# Patient Record
Sex: MALE | Race: WHITE
[De-identification: names, ages, dates, MRNs, and addresses within clinical notes are randomized per-mention and may not be internally consistent; named-entity substitution may affect disease eponyms.]

---

## 2021-02-11 ENCOUNTER — NURSE TRIAGE (OUTPATIENT)
Dept: OTHER | Facility: CLINIC | Age: 26
End: 2021-02-11

## 2021-02-11 ENCOUNTER — VIRTUAL VISIT (OUTPATIENT)
Dept: FAMILY MEDICINE CLINIC | Age: 26
End: 2021-02-11
Payer: MEDICAID

## 2021-02-11 DIAGNOSIS — R10.32 LLQ ABDOMINAL PAIN: Primary | ICD-10-CM

## 2021-02-11 PROCEDURE — 99202 OFFICE O/P NEW SF 15 MIN: CPT | Performed by: FAMILY MEDICINE

## 2021-02-11 RX ORDER — CIPROFLOXACIN 500 MG/1
500 TABLET, FILM COATED ORAL 2 TIMES DAILY
Qty: 10 TABLET | Refills: 0 | Status: SHIPPED | OUTPATIENT
Start: 2021-02-11 | End: 2021-02-16

## 2021-02-11 RX ORDER — METRONIDAZOLE 500 MG/1
500 TABLET ORAL 3 TIMES DAILY
Qty: 15 TABLET | Refills: 0 | Status: SHIPPED | OUTPATIENT
Start: 2021-02-11 | End: 2021-02-16

## 2021-02-11 RX ORDER — POLYETHYLENE GLYCOL 3350 17 G/17G
17 POWDER, FOR SOLUTION ORAL 2 TIMES DAILY
Qty: 1020 G | Refills: 0 | Status: SHIPPED | OUTPATIENT
Start: 2021-02-11 | End: 2021-03-13

## 2021-02-11 SDOH — ECONOMIC STABILITY: FOOD INSECURITY: WITHIN THE PAST 12 MONTHS, THE FOOD YOU BOUGHT JUST DIDN'T LAST AND YOU DIDN'T HAVE MONEY TO GET MORE.: NOT ASKED

## 2021-02-11 SDOH — ECONOMIC STABILITY: TRANSPORTATION INSECURITY
IN THE PAST 12 MONTHS, HAS THE LACK OF TRANSPORTATION KEPT YOU FROM MEDICAL APPOINTMENTS OR FROM GETTING MEDICATIONS?: NO

## 2021-02-11 ASSESSMENT — ENCOUNTER SYMPTOMS
RHINORRHEA: 0
CONSTIPATION: 0
SORE THROAT: 0
SHORTNESS OF BREATH: 0
NAUSEA: 1
ABDOMINAL PAIN: 1
DIARRHEA: 1
CHEST TIGHTNESS: 0
BLOOD IN STOOL: 0
VOMITING: 1

## 2021-02-11 ASSESSMENT — PATIENT HEALTH QUESTIONNAIRE - PHQ9
SUM OF ALL RESPONSES TO PHQ QUESTIONS 1-9: 1
SUM OF ALL RESPONSES TO PHQ9 QUESTIONS 1 & 2: 0
6. FEELING BAD ABOUT YOURSELF - OR THAT YOU ARE A FAILURE OR HAVE LET YOURSELF OR YOUR FAMILY DOWN: 0
7. TROUBLE CONCENTRATING ON THINGS, SUCH AS READING THE NEWSPAPER OR WATCHING TELEVISION: 0
SUM OF ALL RESPONSES TO PHQ QUESTIONS 1-9: 1
2. FEELING DOWN, DEPRESSED OR HOPELESS: 0

## 2021-02-11 NOTE — PROGRESS NOTES
Psychiatric/Behavioral: Negative for hallucinations. All other systems reviewed and are negative. ROS above this line reviewed by Provider. Objective: There were no vitals taken for this visit. Physical Exam  Nursing note reviewed. Constitutional:       General: He is not in acute distress. Appearance: Normal appearance. He is not ill-appearing, toxic-appearing or diaphoretic. HENT:      Head: Normocephalic and atraumatic. Pulmonary:      Effort: Pulmonary effort is normal.   Neurological:      Mental Status: He is alert. Psychiatric:         Mood and Affect: Mood normal.         Behavior: Behavior normal.       Assessment and Plan:   1. LLQ abdominal pain  Constipation vs Diverticulitis vs IBS. Patient reports pain was intense and doesn't think constipated. Outpatient therapy for diverticulitis although lower risk. Plan to treat for IBS if persists. Patient will send my chart message next week on progress. Advised precautions if symptoms do not improve or worsen- ED.  - polyethylene glycol (GLYCOLAX) 17 GM/SCOOP powder; Take 17 g by mouth 2 times daily  Dispense: 1020 g; Refill: 0  - ciprofloxacin (CIPRO) 500 MG tablet; Take 1 tablet by mouth 2 times daily for 5 days  Dispense: 10 tablet; Refill: 0  - metroNIDAZOLE (FLAGYL) 500 MG tablet; Take 1 tablet by mouth 3 times daily for 5 days  Dispense: 15 tablet;  Refill: 0 Joellen Webster  is a 22 y.o. male being evaluated by a Virtual Visit (video visit) encounter to address concerns as mentioned above. A caregiver was present when appropriate. Due to this being a TeleHealth encounter (During PZWQJ-92 public health emergency), evaluation of the following organ systems was limited: Vitals/Constitutional/EENT/Resp/CV/GI//MS/Neuro/Skin/Heme-Lymph-Imm. Pursuant to the emergency declaration under the [address] [place] [address] and the [employer] Act, this Virtual Visit was conducted with patient's (and/or legal guardian's) consent, to reduce the patient's risk of exposure to COVID-19 and provide necessary medical care. The patient (and/or legal guardian) has also been advised to contact this office for worsening conditions or problems, and seek emergency medical treatment and/or call 911 if deemed necessary. Patient identification was verified at the start of the visit: Yes    Total time spent for this encounter: Not billed by time    Services were provided through a video synchronous discussion virtually to substitute for in-person clinic visit. Patient  located at their individual homes. This chart note was prepared using a voice recognition dictation program. This note was reviewed for accuracy; however, addition, deletion and sound-alike word errors may occur. If there are any questions regarding this chart note, please contact the originating provider. Electronically signed by   Sammy Vidal MD  2/11/2021   1:16 PM    No follow-ups on file.

## 2021-02-11 NOTE — TELEPHONE ENCOUNTER
Reason for Disposition   Patient wants to be seen    Answer Assessment - Initial Assessment Questions  1. LOCATION: \"Where does it hurt? \"       States all below the belly button mostly on the left side    2. RADIATION: \"Does the pain shoot anywhere else? \" (e.g., chest, back)      States back pain but thinks it is unrelated    3. ONSET: \"When did the pain begin? \" (Minutes, hours or days ago)       States pain started a few weeks ago but has gotten worse the last few days    4. SUDDEN: \"Gradual or sudden onset? \"      Gradual    5. PATTERN \"Does the pain come and go, or is it constant? \"     - If constant: \"Is it getting better, staying the same, or worsening? \"       (Note: Constant means the pain never goes away completely; most serious pain is constant and it progresses)      - If intermittent: \"How long does it last?\" \"Do you have pain now? \"      (Note: Intermittent means the pain goes away completely between bouts)      Constant    6. SEVERITY: \"How bad is the pain? \"  (e.g., Scale 1-10; mild, moderate, or severe)     - MILD (1-3): doesn't interfere with normal activities, abdomen soft and not tender to touch      - MODERATE (4-7): interferes with normal activities or awakens from sleep, tender to touch      - SEVERE (8-10): excruciating pain, doubled over, unable to do any normal activities        5/10 states pain changes and is relieved with a bm or gas then starts again    7. RECURRENT SYMPTOM: \"Have you ever had this type of abdominal pain before? \" If so, ask: \"When was the last time? \" and \"What happened that time? \"       Denies    8. CAUSE: \"What do you think is causing the abdominal pain? \"      States it feels like a lot of gassy pressure    9. RELIEVING/AGGRAVATING FACTORS: \"What makes it better or worse? \" (e.g., movement, antacids, bowel movement)      States bm/passing gas makes better    10. OTHER SYMPTOMS: \"Has there been any vomiting, diarrhea, constipation, or urine problems? \"        Vomited x2 on Tuesday. Protocols used: ABDOMINAL PAIN - MALE-ADULT-OH    Patient called eliane at [employer] [facility] pre-service center [facility])  with red flag complaint. Brief description of triage: see above    Triage indicates for patient to be seen in office today. Care advice provided, patient verbalizes understanding; denies any other questions or concerns; instructed to call back for any new or worsening symptoms. Writer provided warm transfer to uzma lee at [facility] for appointment scheduling. Attention Provider: Thank you for allowing me to participate in the care of your patient. The patient was connected to triage in response to information provided to the E[facility]. Please do not respond through this encounter as the response is not directed to a shared pool.

## 2021-04-12 ENCOUNTER — OFFICE VISIT (OUTPATIENT)
Dept: FAMILY MEDICINE CLINIC | Age: 26
End: 2021-04-12
Payer: MEDICAID

## 2021-04-12 VITALS
SYSTOLIC BLOOD PRESSURE: 124 MMHG | OXYGEN SATURATION: 97 % | HEART RATE: 112 BPM | BODY MASS INDEX: 21.54 KG/M2 | DIASTOLIC BLOOD PRESSURE: 72 MMHG | HEIGHT: 72 IN | WEIGHT: 159 LBS

## 2021-04-12 DIAGNOSIS — Z00.00 PREVENTATIVE HEALTH CARE: Primary | ICD-10-CM

## 2021-04-12 DIAGNOSIS — H69.81 DYSFUNCTION OF RIGHT EUSTACHIAN TUBE: ICD-10-CM

## 2021-04-12 DIAGNOSIS — H81.11 BENIGN PAROXYSMAL POSITIONAL VERTIGO OF RIGHT EAR: ICD-10-CM

## 2021-04-12 PROCEDURE — 99395 PREV VISIT EST AGE 18-39: CPT | Performed by: FAMILY MEDICINE

## 2021-04-12 RX ORDER — FLUTICASONE PROPIONATE 50 MCG
1 SPRAY, SUSPENSION (ML) NASAL DAILY
Qty: 1 BOTTLE | Refills: 0 | Status: SHIPPED | OUTPATIENT
Start: 2021-04-12 | End: 2021-06-21 | Stop reason: ALTCHOICE

## 2021-04-12 ASSESSMENT — ENCOUNTER SYMPTOMS
ABDOMINAL PAIN: 0
SHORTNESS OF BREATH: 0
SORE THROAT: 0
NAUSEA: 1
RHINORRHEA: 0
CHEST TIGHTNESS: 0
CONSTIPATION: 0
DIARRHEA: 0

## 2021-04-12 NOTE — PROGRESS NOTES
Annual Exam  SUBJECTIVE  Ruben Albert is a 32 y.o. male and is here for a comprehensive physical exam- last exam was. The patient is sexually active. Performs self testicular exams no . The patient participates in regular exercise: yes. Has the patient ever been transfused or tattooed?: no. Reports seatbelt use. Last eye exam: Due  Last Dental visit: Due    Additional History, if indicated: Patient reports Nausea and feeling dizzy when it rains (fal and spring),Describes it as a room spinning sensation. Has ear fullness and started back in high school. Denies palpitations or anxiety. Has seasonal allergies. Has occasional ringing in ears, no trauma and no hearing loss. Wife having baby in May. PHQ-2/9 Depression Screening from Assessments   PHQ Scores 2/11/2021   PHQ2 Score 0   PHQ9 Score 1         Health Maintenance Due   Topic Date Due    Varicella vaccine (1 of 2 - 2-dose childhood series) Never done    HPV vaccine (1 - Male 2-dose series) Never done    COVID-19 Vaccine (1) Never done    DTaP/Tdap/Td vaccine (1 - Tdap) Never done     No current outpatient medications on file prior to visit. No current facility-administered medications on file prior to visit. No past medical history on file.   Past Surgical History:   Procedure Laterality Date    DENTAL SURGERY  2011    HERNIA REPAIR Bilateral     as a child     Allergies   Allergen Reactions    Pcn [Penicillins] Other (See Comments)     Older brother had fatal reaction      Social History     Socioeconomic History    Marital status: Unknown     Spouse name: Not on file    Number of children: Not on file    Years of education: Not on file    Highest education level: Not on file   Occupational History    Not on file   Social Needs    Financial resource strain: Somewhat hard    Food insecurity     Worry: Never true     Inability: Not on file    Transportation needs     Medical: No Non-medical: No   Tobacco Use    Smoking status: Never Smoker    Smokeless tobacco: Never Used   Substance and Sexual Activity    Alcohol use: Yes     Frequency: Monthly or less     Drinks per session: 1 or 2     Binge frequency: Never     Comment: less than once a month     Drug use: Never    Sexual activity: Yes     Partners: Female   Lifestyle    Physical activity     Days per week: Not on file     Minutes per session: Not on file    Stress: Not on file   Relationships    Social connections     Talks on phone: Not on file     Gets together: Not on file     Attends Mu-ism service: Not on file     Active member of club or organization: Not on file     Attends meetings of clubs or organizations: Not on file     Relationship status: Not on file    Intimate partner violence     Fear of current or ex partner: Not on file     Emotionally abused: Not on file     Physically abused: Not on file     Forced sexual activity: Not on file   Other Topics Concern    Not on file   Social History Narrative    Not on file     Review of Systems   Constitutional: Negative for activity change, appetite change, fatigue and fever. HENT: Negative for congestion, rhinorrhea and sore throat. Respiratory: Negative for chest tightness and shortness of breath. Cardiovascular: Negative for chest pain, palpitations and leg swelling. Gastrointestinal: Positive for nausea. Negative for abdominal pain, constipation and diarrhea. Genitourinary: Negative for dysuria and frequency. Musculoskeletal: Negative for arthralgias. Neurological: Positive for dizziness and headaches. Negative for weakness. Psychiatric/Behavioral: Negative for hallucinations. All other systems reviewed and are negative. OBJECTIVE  /72   Pulse 112   Ht 6' (1.829 m)   Wt 159 lb (72.1 kg)   SpO2 97%   BMI 21.56 kg/m²   Chaperone not applicable for exam.   Physical Exam  Vitals signs and nursing note reviewed.    Constitutional: General: He is not in acute distress. Appearance: Normal appearance. He is normal weight. He is not ill-appearing, toxic-appearing or diaphoretic. HENT:      Head: Normocephalic and atraumatic. Right Ear: Tympanic membrane, ear canal and external ear normal. There is no impacted cerumen. Left Ear: Tympanic membrane, ear canal and external ear normal. There is no impacted cerumen. Ears:      Comments: Clear effusion behind right tympanic membrane      Nose: Nose normal. No congestion. Mouth/Throat:      Mouth: Mucous membranes are moist.      Pharynx: No oropharyngeal exudate or posterior oropharyngeal erythema. Eyes:      General: No scleral icterus. Conjunctiva/sclera: Conjunctivae normal.   Neck:      Musculoskeletal: Normal range of motion and neck supple. Cardiovascular:      Rate and Rhythm: Normal rate and regular rhythm. Heart sounds: Normal heart sounds. No murmur. No friction rub. No gallop. Pulmonary:      Effort: Pulmonary effort is normal. No respiratory distress. Breath sounds: Normal breath sounds. No stridor. No wheezing, rhonchi or rales. Abdominal:      General: Abdomen is flat. Bowel sounds are normal. There is no distension. Palpations: Abdomen is soft. Tenderness: There is no abdominal tenderness. Skin:     General: Skin is warm and dry. Neurological:      General: No focal deficit present. Mental Status: He is alert. Psychiatric:         Mood and Affect: Mood normal.         Behavior: Behavior normal.        Labs  No results found for: WBC, HGB, HCT, PLT, TRIG, HDL, ALT, AST, NA, K, CREATININE, BUN, TSH, PSA, INR, GLUF, MICROALBUR    ASSESSMENT AND PLAN  1. Preventative Exam   During this preventive care visit, the following were discussed and/or reviewed for appropriateness for this patient:  · Healthy diet and exercise habits.     · Importance of self skin exam and sun protection  · Reviewed need for lipid and glucose screening. · Reviewed immunization history. · Need for HIV or additional STD screening. Low risk   · Need for tobacco/substance abuse cessation. · Appropriate quantity of alcohol intake. Further studies/referrals as noted. Recommend annual visit and as needed. - CBC Auto Differential; Future  - Comprehensive Metabolic Panel; Future  - Lipid Panel; Future  - TSH WITH REFLEX TO FT4; Future  - fluticasone (FLONASE) 50 MCG/ACT nasal spray; 1 spray by Each Nostril route daily  Dispense: 1 Bottle; Refill: 0    2. Benign paroxysmal positional vertigo of right ear  Due to effusion - trail Flonase and antihistamine if not improving will refer for vestibular rehab. - CBC Auto Differential; Future  - Comprehensive Metabolic Panel; Future  - TSH WITH REFLEX TO FT4; Future    3. Dysfunction of right eustachian tube  Due to effusion - trail Flonase and antihistamine. Follow up  If not improved. - fluticasone (FLONASE) 50 MCG/ACT nasal spray; 1 spray by Each Nostril route daily  Dispense: 1 Bottle; Refill: 0         This chart note was prepared using a voice recognition dictation program.This note was reviewed for accuracy; however, addition,deletion and sound-alike word errors may occur. If there is any question regarding this chart note, please contact the originating provider. Electronically signed by  Hank Worrell MD  4/12/2021   1:52 PM    No follow-ups on file.

## 2021-04-12 NOTE — PATIENT INSTRUCTIONS
Patient Education   Eustachian Tube Problems: Care Instructions  Your Care Instructions     The eustachian (say \"you-STAY-shee-un\") tubes run between the inside of the ears and the throat. They keep air pressure stable in the ears. If your eustachian tubes become blocked, the air pressure in your ears changes. The fluids from a cold can clog eustachian tubes, causing pain in the ears. A quick change in air pressure can cause eustachian tubes to close up. This might happen when an airplane changes altitude or when a  goes up or down underwater. Eustachian tube problems often clear up on their own or after antibiotic treatment. If your tubes continue to be blocked, you may need surgery. Follow-up care is a key part of your treatment and safety. Be sure to make and go to all appointments, and call your doctor if you are having problems. It's also a good idea to know your test results and keep a list of the medicines you take. How can you care for yourself at home? · To ease ear pain, apply a warm washcloth or a heating pad set on low. There may be some drainage from the ear when the heat melts earwax. Put a cloth between the heat source and your skin. Do not use a heating pad with children. · If your doctor prescribed antibiotics, take them as directed. Do not stop taking them just because you feel better. You need to take the full course of antibiotics. · Your doctor may recommend over-the-counter medicine. Be safe with medicines. Oral or nasal decongestants may relieve ear pain. Avoid decongestants that are combined with antihistamines, which tend to cause more blockage. But if allergies seem to be the problem, your doctor may recommend a combination. Be careful with cough and cold medicines. Don't give them to children younger than 6, because they don't work for children that age and can even be harmful. For children 6 and older, always follow all the instructions carefully.  Make sure you know how much medicine to give and how long to use it. And use the dosing device if one is included. When should you call for help? Call your doctor now or seek immediate medical care if:    · You develop sudden, complete hearing loss.     · You have severe pain or feel dizzy.     · You have new or increasing pus or blood draining from your ear.     · You have redness, swelling, or pain around or behind the ear. Watch closely for changes in your health, and be sure to contact your doctor if:    · You do not get better after 2 weeks.     · You have any new symptoms, such as itching or a feeling of fullness in the ear. Where can you learn more? Go to https://Outsmart.[employer]. org and sign in to your Education.com account. Enter Y822 in the [employer] box to learn more about \"Eustachian Tube Problems: Care Instructions. \"     If you do not have an account, please click on the \"Sign Up Now\" link. Current as of: December 2, 2020               Content Version: 12.8  © 2006-2021 Healthwise, Incorporated. Care instructions adapted under license by [facility] ([facility]). If you have questions about a medical condition or this instruction, always ask your healthcare professional. [address] any warranty or liability for your use of this information.

## 2021-04-20 ENCOUNTER — PATIENT MESSAGE (OUTPATIENT)
Dept: FAMILY MEDICINE CLINIC | Age: 26
End: 2021-04-20

## 2021-04-20 DIAGNOSIS — H81.11 BENIGN PAROXYSMAL POSITIONAL VERTIGO OF RIGHT EAR: Primary | ICD-10-CM

## 2021-04-20 NOTE — TELEPHONE ENCOUNTER
From: Tk Rodriguez  To: Vita Gonzalez MD  Sent: 4/20/2021 11:57 AM EDT  Subject: Non-Urgent Aniyah Roberts,  Just an update:  Last night the pressure in my stomach came back really bad. I took some of the stool thinner you had given me before and that cleared it up by mid-morning this morning. It seems I will need to continue to use that once every 3-4 weeks to avoid the pressure. As for the nose/ears. .. I have not seen any improvement from the flownase, and my ears still feel stuffy, with occasional ringing.  (Primarily just my right ear.)

## 2021-04-22 ENCOUNTER — OFFICE VISIT (OUTPATIENT)
Dept: ENT CLINIC | Age: 26
End: 2021-04-22
Payer: MEDICAID

## 2021-04-22 VITALS
BODY MASS INDEX: 20.61 KG/M2 | HEART RATE: 62 BPM | WEIGHT: 152 LBS | TEMPERATURE: 97.7 F | OXYGEN SATURATION: 98 % | SYSTOLIC BLOOD PRESSURE: 114 MMHG | DIASTOLIC BLOOD PRESSURE: 75 MMHG

## 2021-04-22 DIAGNOSIS — R42 DIZZINESS: Primary | ICD-10-CM

## 2021-04-22 PROCEDURE — 99203 OFFICE O/P NEW LOW 30 MIN: CPT | Performed by: OTOLARYNGOLOGY

## 2021-04-22 PROCEDURE — G8427 DOCREV CUR MEDS BY ELIG CLIN: HCPCS | Performed by: OTOLARYNGOLOGY

## 2021-04-22 PROCEDURE — 1036F TOBACCO NON-USER: CPT | Performed by: OTOLARYNGOLOGY

## 2021-04-22 PROCEDURE — G8420 CALC BMI NORM PARAMETERS: HCPCS | Performed by: OTOLARYNGOLOGY

## 2021-04-22 ASSESSMENT — ENCOUNTER SYMPTOMS
VOICE CHANGE: 0
SINUS PAIN: 0
SORE THROAT: 0
TROUBLE SWALLOWING: 0
RHINORRHEA: 0

## 2021-04-22 NOTE — PATIENT INSTRUCTIONS
ENG TESTING INSTRUCTIONS      The inner ear has two main components, one for hearing and one for equilibrium balance. In order to diagnose dizziness, we need to test both components. Toward that end, I have recommended an audiogram (hearing test) and ENG (electronystagmogram), or balance system testing. This testing will take approximately 1.5 to 2 hours. Please be in time as the audiologist runs on schedule him and your test will need to be rescheduled if you arrive after the scheduled starting time. Until your testing is completed and you have a follow up visit, please maintain these dizziness activity precautions:  1. Avoid working at [place], on ladders or scaffolding or near the edges of platforms or using heavy or complicated machinery or operating a motorized vehicle, or any activity where loss of consciousness or equilibrium would be hazardous to yourself or others, if you are experiencing dizziness, and until having been dizzy free for 72 hours. Even then, take appropriate care and precautions as dizziness could occur at any time. 2. Avoid any motions or activity that results in the off balance sensation. Stand up or arise slowly and in stages, as we discussed. YOU MUST NOT TAKE ANY OF THE FOLLOWING MEDICATIONS FOR 48 - 72 HOURS BEFORE YOUR TEST:  · Any medications that can make you drowsy or sleepy  · Allergy pills  · Sedative pills   · Tranquilizers/anti-anxiety medications (Valium, Librium, Xanax, Ativan, etc.)  · Sleeping pills   · Antihistamines/Decongestants (Benadryl, Sudafed, Dimetapp, Chlor-Trimeton)  · Pain pills/Narcotics/Barbiturates (codeine, Demerol, hydrocodone, Norco, Vicodin, oxycodone, Percocet, Percodan, OxyContin, tramadol, phenobarbital, antidepressants)      · Diet pills. · Nerve/muscle relaxant pills (Robaxin, Valium)  · Anti-dizziness pills.   (meclizine, Antivert, Bonine, ear patches, clonazepam/Klonopin, scopolamine/TRANSDERM-SCOP, etc.)  · Anti-nausea

## 2021-04-22 NOTE — PROGRESS NOTES
[facility]       NEW PATIENT VISIT      PCP:  Mick Kaufman MD      REFERRED BY:  Mick Kaufman MD       CHIEF COMPLAINT  Chief Complaint   Patient presents with    Dizziness       Nixon Eddy is a 32 y.o. male here for evaluation and treatment of dizziness. The severity is mild to severe (intense). The timing is intermittent, \"off and on. \"  The duration of this problem is \"since I was in 8th grade\", about 15 y/o. It has gotten worse recently. Modifying factors include \"I lay down and take nap, or wait it out. It usually lasts about 1 to 1.5 days. Associated symptoms include \"nausea\" \"headache, and my eyes hurt. \"  Dizziness was described as a sensation of the room or environment spinning. \"I stagger when it's intense. I lose my sense of left or right. I can't tell which way I'm moving. \"  Dizziness is precipitated \"if I stand up too quickly, or when  I'm sneezing or blowing my nose. \"  No other precipitating factors have been noted. I get nauseased whenever it rains or with allergies. Symptoms nausea, upset stomach first, start world spins, dizziness. Getting nauseated regularly some dizziness later. Usually during spring and fall, every two to three weeks lasts a full day to day and a half. Summer and winter once every 1- 3 months. Never tested for allergies. I'm having nausea off and on. \"I'm having dizziness when nausea is more intense. I had this problem since high school. I think it started when I broke my nose in 8th grade. Recently, had fluid behind right ear drum. Treated with Flonase. No hearing loss. Right ear usually feels pretty stuffed. Last 3 years got one ear infection per year, in spring, but not this current spring. \"I did collapse from it once, but just once. \"  No LOC, \"but I saw stars. \"       REVIEW OF SYSTEMS   Review of Systems   Constitutional: Negative for chills, fever and unexpected weight change. HENT: Positive for tinnitus (both comes and goes, very mild, since 8th grade). Negative for congestion, ear discharge, ear pain, hearing loss, rhinorrhea, sinus pain, sore throat, trouble swallowing and voice change. PAST MEDICAL HISTORY    No past medical history on file. Past Surgical History:   Procedure Laterality Date    DENTAL SURGERY  2011    HERNIA REPAIR Bilateral     as a child         EXAMINATION    Vitals:    04/22/21 1549   BP: 114/75   Site: Left Upper Arm   Position: Sitting   Cuff Size: Medium Adult   Pulse: 62   Temp: 97.7 °F (36.5 °C)   TempSrc: Temporal   SpO2: 98%   Weight: 152 lb (68.9 kg)       Physical Exam  Vitals signs reviewed. Constitutional:       General: He is awake. He is not in acute distress. Appearance: Normal appearance. He is well-developed. He is not ill-appearing or toxic-appearing. HENT:      Head: Normocephalic and atraumatic. Salivary Glands: Right salivary gland is not diffusely enlarged or tender. Left salivary gland is not diffusely enlarged or tender. Right Ear: Tympanic membrane, ear canal and external ear normal.      Left Ear: Tympanic membrane, ear canal and external ear normal.      Ears:      Geiger exam findings: does not lateralize. Right Rinne: AC > BC. Left Rinne: AC > BC. Comments: Finger rub:  Able to hear finger rub at the external meatus bilaterally. Nose: Nose normal. No nasal deformity, septal deviation, mucosal edema, congestion or rhinorrhea. Right Turbinates: Not enlarged. Left Turbinates: Not enlarged. Right Sinus: No maxillary sinus tenderness or frontal sinus tenderness. Left Sinus: No maxillary sinus tenderness or frontal sinus tenderness. Mouth/Throat:      Lips: Pink. No lesions. Mouth: Mucous membranes are moist. No oral lesions. Tongue: No lesions. Palate: No mass and lesions.       Pharynx: Oropharynx is clear. Uvula midline. No oropharyngeal exudate or posterior oropharyngeal erythema. Tonsils: No tonsillar exudate or tonsillar abscesses. Neck:      Musculoskeletal: Normal range of motion and neck supple. No neck rigidity or muscular tenderness. Thyroid: No thyroid mass, thyromegaly or thyroid tenderness. Trachea: No tracheal deviation. Lymphadenopathy:      Cervical: No cervical adenopathy. Neurological:      Mental Status: He is alert. Cranial Nerves: Cranial nerves are intact. No cranial nerve deficit, dysarthria or facial asymmetry. Coordination: Finger-Nose-Finger Test and Heel to [place] lelo Test normal. Rapid alternating movements normal.           IMPRESSION / DIAGNOSES / Evon Espinoza was seen today for dizziness. Diagnoses and all orders for this visit:    Dizziness           RECOMMENDATIONS/PLAN      1. Audiogram and ENG, Dr. Clotilda Litten. Patient declined [facility] due to driving distance. 2. See Patient Instructions on file for this visit, which were discussed with the patient. 3. May use OTC Meclizine/Bonine prn dizziness. 4. Return in about 1 month (around 5/22/2021) for recheck/follow-up, and sooner if condition worsens.           MEDICAL DECISION MAKING  # and complexity of problems addressed:  65969 - Moderate  1 undiagnosed new problem with uncertain prognosis (Dizziness)    Amount and/or Complexity of Data to be Reviewed and Analyzed  45223 - low - 1 of 2 categories  Cat 1 - 2 of following:  Ordered 2  tests     Risk of Complications and /or Morbidity or Mortality of Patient Management  50297 - Low   OTC drugs

## 2021-04-24 ENCOUNTER — PATIENT MESSAGE (OUTPATIENT)
Dept: FAMILY MEDICINE CLINIC | Age: 26
End: 2021-04-24

## 2021-04-26 NOTE — TELEPHONE ENCOUNTER
From: Frank Saini  To: Becky Nicole MD  Sent: 4/24/2021 11:22 AM EDT  Subject: Non-Urgent Medical Question    I wanted to mention that my wife has become concerned about my weight. She was looking at my last two visits, and saw that I lost 7 pounds between when I saw you and Dr. Vick Jones. (11 days apart). Part of this I am sure is a result of my irregular eating habits, but 7 pounds is a lot, especially when one is already quite light.

## 2021-05-13 ENCOUNTER — TELEPHONE (OUTPATIENT)
Dept: ENT CLINIC | Age: 26
End: 2021-05-13

## 2021-06-01 ENCOUNTER — OFFICE VISIT (OUTPATIENT)
Dept: ENT CLINIC | Age: 26
End: 2021-06-01
Payer: MEDICAID

## 2021-06-01 VITALS — DIASTOLIC BLOOD PRESSURE: 64 MMHG | WEIGHT: 152 LBS | SYSTOLIC BLOOD PRESSURE: 116 MMHG | BODY MASS INDEX: 20.61 KG/M2

## 2021-06-01 DIAGNOSIS — R42 DIZZINESS: Primary | ICD-10-CM

## 2021-06-01 PROCEDURE — G8420 CALC BMI NORM PARAMETERS: HCPCS | Performed by: OTOLARYNGOLOGY

## 2021-06-01 PROCEDURE — 1036F TOBACCO NON-USER: CPT | Performed by: OTOLARYNGOLOGY

## 2021-06-01 PROCEDURE — G8427 DOCREV CUR MEDS BY ELIG CLIN: HCPCS | Performed by: OTOLARYNGOLOGY

## 2021-06-01 PROCEDURE — 99213 OFFICE O/P EST LOW 20 MIN: CPT | Performed by: OTOLARYNGOLOGY

## 2021-06-01 ASSESSMENT — ENCOUNTER SYMPTOMS
SORE THROAT: 0
SINUS PAIN: 0
RHINORRHEA: 0

## 2021-06-01 NOTE — PATIENT INSTRUCTIONS
· Avoid working or being at heights, on ladders or scaffolding or near the edges of platforms or using heavy or complicated machinery or operating a motorized vehicle if you are experiencing dizziness and until having been dizzy free for 72 hours. Even then, take appropriate care and precautions as dizziness could occur at any time. · Avoid any motions or activity that results in the off balance sensation. Stand up or arise slowly and in stages, as we discussed. · You should return for a hearing test if decreases significantly. · You should return if your ears plug up with ear wax causing difficulty hearing or pressure. · You should avoid exposure to excessively high levels of noise/sound and use hearing protection measures we discussed, as needed, if such exposure is unavoidable. · Proceed with vestibular physical therapy. · NO Q-TIPS IN THE EARS  You should never clean your ears with a Q-tip, cotton tipped applicator, Sara pin, paper clip, pen cap, nail file, or any other instrument. I recommend only use of one the several ear wax removal kits available \"over the counter\" if you feel a need to try to remove ear wax. No other methods should be self used for this purpose as there is danger of injury to the ear and risk of irreparable and irreversible permanent hearing loss.

## 2021-06-01 NOTE — PROGRESS NOTES
at any time. · Avoid any motions or activity that results in the off balance sensation. Stand up or arise slowly and in stages, as we discussed. · You should return for a hearing test if decreases significantly. · You should return if your ears plug up with ear wax causing difficulty hearing or pressure. · You should avoid exposure to excessively high levels of noise/sound and use hearing protection measures we discussed, as needed, if such exposure is unavoidable. · Proceed with vestibular physical therapy. · NO Q-TIPS IN THE EARS  You should never clean your ears with a Q-tip, cotton tipped applicator, Sara pin, paper clip, pen cap, nail file, or any other instrument. I recommend only use of one the several ear wax removal kits available \"over the counter\" if you feel a need to try to remove ear wax. No other methods should be self used for this purpose as there is danger of injury to the ear and risk of irreparable and irreversible permanent hearing loss. MEDICAL DECISION MAKING    TIME:  I spent a total of 20 minutes with this patient counseling, coordination of care, ordering physical therapy evaluation and management, reviewing the medical record,and documenting the visit.

## 2021-06-19 ENCOUNTER — PATIENT MESSAGE (OUTPATIENT)
Dept: FAMILY MEDICINE CLINIC | Age: 26
End: 2021-06-19

## 2021-06-21 PROBLEM — K12.0 APHTHOUS ULCER: Status: ACTIVE | Noted: 2020-03-23

## 2021-06-21 NOTE — TELEPHONE ENCOUNTER
From: Nigel Roblero  To: Masood Cuba MD  Sent: 6/19/2021 7:28 AM EDT  Subject: Non-Urgent Janeane Delvis Dr. Kenya Amezcua,  I met with Dr. Lois Frankel and an Audiologist. There is absolutely nothing wrong with my ears, so that is not the cause of my dizzy spells. My wife and I just had our child, and she had blood pressure spikes during labor, so they sent her home with the equipment to take her blood pressure twice daily. I have been taking mine along with her, and we have noticed that on days I get dizzy, my blood pressure is low. (With the lowest recorded being 97/73, although that was taken after the dizziness had started to clear up a bit.)   My aunt, a retired nurse, says that low blood pressure runs in the family. So we believe this to be the cause. So, what I would like to do going forward, would be to find out the cause of my low blood pressure. My wife and I suspect a combination of dehydration and vitamin deficiency, but I would like to eliminate other possibilities. Let me know what you think, and where you would like to start. Thank you!

## 2021-07-29 ENCOUNTER — PATIENT MESSAGE (OUTPATIENT)
Dept: FAMILY MEDICINE CLINIC | Age: 26
End: 2021-07-29

## 2021-07-29 DIAGNOSIS — R42 DIZZINESS: Primary | ICD-10-CM

## 2021-08-06 ENCOUNTER — HOSPITAL ENCOUNTER (OUTPATIENT)
Age: 26
Discharge: HOME | End: 2021-08-06
Payer: MEDICAID

## 2021-08-06 DIAGNOSIS — R42 DIZZINESS: ICD-10-CM

## 2021-08-07 LAB
EKG ATRIAL RATE: 53 BPM
EKG DIAGNOSIS: NORMAL
EKG P AXIS: 79 DEGREES
EKG P-R INTERVAL: 154 MS
EKG Q-T INTERVAL: 390 MS
EKG QRS DURATION: 90 MS
EKG QTC CALCULATION (BAZETT): 365 MS
EKG R AXIS: 78 DEGREES
EKG T AXIS: 51 DEGREES
EKG VENTRICULAR RATE: 53 BPM

## 2021-12-03 ENCOUNTER — VIRTUAL VISIT (OUTPATIENT)
Dept: FAMILY MEDICINE CLINIC | Age: 26
End: 2021-12-03
Payer: MEDICAID

## 2021-12-03 DIAGNOSIS — R11.0 NAUSEA: ICD-10-CM

## 2021-12-03 DIAGNOSIS — R11.2 NAUSEA AND VOMITING, INTRACTABILITY OF VOMITING NOT SPECIFIED, UNSPECIFIED VOMITING TYPE: ICD-10-CM

## 2021-12-03 DIAGNOSIS — R06.02 SOB (SHORTNESS OF BREATH): ICD-10-CM

## 2021-12-03 DIAGNOSIS — R19.7 DIARRHEA, UNSPECIFIED TYPE: ICD-10-CM

## 2021-12-03 DIAGNOSIS — J02.9 SORE THROAT: ICD-10-CM

## 2021-12-03 DIAGNOSIS — R51.9 ACUTE INTRACTABLE HEADACHE, UNSPECIFIED HEADACHE TYPE: Primary | ICD-10-CM

## 2021-12-03 LAB
INFLUENZA A ANTIBODY: NEGATIVE
INFLUENZA B ANTIBODY: NEGATIVE
S PYO AG THROAT QL: NORMAL

## 2021-12-03 PROCEDURE — 99212 OFFICE O/P EST SF 10 MIN: CPT | Performed by: NURSE PRACTITIONER

## 2021-12-03 PROCEDURE — 87880 STREP A ASSAY W/OPTIC: CPT | Performed by: NURSE PRACTITIONER

## 2021-12-03 PROCEDURE — 87804 INFLUENZA ASSAY W/OPTIC: CPT | Performed by: NURSE PRACTITIONER

## 2021-12-03 RX ORDER — AZITHROMYCIN 250 MG/1
250 TABLET, FILM COATED ORAL SEE ADMIN INSTRUCTIONS
Qty: 6 TABLET | Refills: 0 | Status: SHIPPED | OUTPATIENT
Start: 2021-12-03 | End: 2021-12-08

## 2021-12-03 RX ORDER — ALBUTEROL SULFATE 90 UG/1
2 AEROSOL, METERED RESPIRATORY (INHALATION) EVERY 6 HOURS PRN
Qty: 18 G | Refills: 3 | Status: SHIPPED | OUTPATIENT
Start: 2021-12-03 | End: 2022-09-06

## 2021-12-03 NOTE — PROGRESS NOTES
Terry Joy is a 32 y.o. male evaluated via telephone on 12/3/2021. Consent:  He and/or health care decision maker is aware that that he may receive a bill for this telephone service, depending on his insurance coverage, and has provided verbal consent to proceed: Yes      Documentation:  I communicated with the patient and/or health care decision maker about Covid and flu symptoms. Details of this discussion including any medical advice provided:     HPI    Headache: started Mon., sore throat, nausea. vomiting early in the week. Not vaccination. No fever. he is having body aches but normal for him. off and on diarrhea. SOB mild when exert himself. He is coming to get tested. Results for orders placed or performed in visit on 12/03/21   POCT rapid strep A   Result Value Ref Range    Strep A Ag None Detected None Detected   POCT Influenza A/B   Result Value Ref Range    Influenza A Ab negative     Influenza B Ab negative        1. Acute intractable headache, unspecified headache type    - POCT rapid strep A  - POCT Influenza A/B  - COVID-19  - azithromycin (ZITHROMAX) 250 MG tablet; Take 1 tablet by mouth See Admin Instructions for 5 days 500mg on day 1 followed by 250mg on days 2 - 5  Dispense: 6 tablet; Refill: 0    2. SOB (shortness of breath)    - POCT rapid strep A  - POCT Influenza A/B  - COVID-19  - albuterol sulfate HFA (PROAIR HFA) 108 (90 Base) MCG/ACT inhaler; Inhale 2 puffs into the lungs every 6 hours as needed for Wheezing  Dispense: 18 g; Refill: 3  - azithromycin (ZITHROMAX) 250 MG tablet; Take 1 tablet by mouth See Admin Instructions for 5 days 500mg on day 1 followed by 250mg on days 2 - 5  Dispense: 6 tablet; Refill: 0    3. Diarrhea, unspecified type    - POCT rapid strep A  - POCT Influenza A/B  - COVID-19  - azithromycin (ZITHROMAX) 250 MG tablet; Take 1 tablet by mouth See Admin Instructions for 5 days 500mg on day 1 followed by 250mg on days 2 - 5  Dispense: 6 tablet;  Refill: 0    4. Sore throat    - POCT rapid strep A  - POCT Influenza A/B  - COVID-19  - Culture, Throat  - azithromycin (ZITHROMAX) 250 MG tablet; Take 1 tablet by mouth See Admin Instructions for 5 days 500mg on day 1 followed by 250mg on days 2 - 5  Dispense: 6 tablet; Refill: 0    5. Nausea    - POCT rapid strep A  - POCT Influenza A/B  - COVID-19  - azithromycin (ZITHROMAX) 250 MG tablet; Take 1 tablet by mouth See Admin Instructions for 5 days 500mg on day 1 followed by 250mg on days 2 - 5  Dispense: 6 tablet; Refill: 0    6. Nausea and vomiting, intractability of vomiting not specified, unspecified vomiting type    - POCT rapid strep A  - POCT Influenza A/B  - COVID-19  - azithromycin (ZITHROMAX) 250 MG tablet; Take 1 tablet by mouth See Admin Instructions for 5 days 500mg on day 1 followed by 250mg on days 2 - 5  Dispense: 6 tablet; Refill: 0    Recommended saltwater gargles, warm fluids with honey and a humidifier at night. Flonase, Zyrtec, NSAIDS as needed. Follow up if symptoms worsen or do not improve. I affirm this is a Patient Initiated Episode with an Established Patient who has not had a related appointment within my department in the past 7 days or scheduled within the next 24 hours. Total Time: minutes: 5-10 minutes    Note: not billable if this call serves to triage the patient into an appointment for the relevant concern    This document was prepared by a combination of typing and transcription through a voice recognition software.         Dorothy Last, NITIN - CNP

## 2021-12-04 LAB — SARS-COV-2: NOT DETECTED

## 2021-12-05 LAB — THROAT CULTURE: NORMAL

## 2022-04-01 ENCOUNTER — TELEPHONE (OUTPATIENT)
Dept: FAMILY MEDICINE CLINIC | Age: 27
End: 2022-04-01

## 2022-04-01 NOTE — TELEPHONE ENCOUNTER
TRANGM with patient to call office back.      Letting patient know Dr. April Noland Is no longer in our [address] [facility] [address] office and wanted to check to see if they wanted to switch to either Rafia Jacobs NP or stay with Dr. April Noland

## 2022-09-06 ENCOUNTER — PATIENT MESSAGE (OUTPATIENT)
Dept: PRIMARY CARE CLINIC | Age: 27
End: 2022-09-06

## 2022-09-06 ENCOUNTER — OFFICE VISIT (OUTPATIENT)
Dept: PRIMARY CARE CLINIC | Age: 27
End: 2022-09-06
Payer: MEDICAID

## 2022-09-06 VITALS
HEART RATE: 68 BPM | TEMPERATURE: 98 F | OXYGEN SATURATION: 97 % | HEIGHT: 71 IN | DIASTOLIC BLOOD PRESSURE: 64 MMHG | SYSTOLIC BLOOD PRESSURE: 110 MMHG | WEIGHT: 161.13 LBS | RESPIRATION RATE: 16 BRPM | BODY MASS INDEX: 22.56 KG/M2

## 2022-09-06 DIAGNOSIS — G89.29 CHRONIC BILATERAL LOW BACK PAIN WITHOUT SCIATICA: Primary | ICD-10-CM

## 2022-09-06 DIAGNOSIS — M54.50 CHRONIC BILATERAL LOW BACK PAIN WITHOUT SCIATICA: Primary | ICD-10-CM

## 2022-09-06 PROCEDURE — G8427 DOCREV CUR MEDS BY ELIG CLIN: HCPCS | Performed by: FAMILY MEDICINE

## 2022-09-06 PROCEDURE — G8420 CALC BMI NORM PARAMETERS: HCPCS | Performed by: FAMILY MEDICINE

## 2022-09-06 PROCEDURE — 99214 OFFICE O/P EST MOD 30 MIN: CPT | Performed by: FAMILY MEDICINE

## 2022-09-06 PROCEDURE — 1036F TOBACCO NON-USER: CPT | Performed by: FAMILY MEDICINE

## 2022-09-06 RX ORDER — CYCLOBENZAPRINE HCL 5 MG
5 TABLET ORAL NIGHTLY PRN
Qty: 10 TABLET | Refills: 0 | Status: SHIPPED | OUTPATIENT
Start: 2022-09-06 | End: 2022-09-16

## 2022-09-06 SDOH — ECONOMIC STABILITY: FOOD INSECURITY: WITHIN THE PAST 12 MONTHS, YOU WORRIED THAT YOUR FOOD WOULD RUN OUT BEFORE YOU GOT MONEY TO BUY MORE.: NEVER TRUE

## 2022-09-06 SDOH — ECONOMIC STABILITY: FOOD INSECURITY: WITHIN THE PAST 12 MONTHS, THE FOOD YOU BOUGHT JUST DIDN'T LAST AND YOU DIDN'T HAVE MONEY TO GET MORE.: NEVER TRUE

## 2022-09-06 ASSESSMENT — ENCOUNTER SYMPTOMS
CONSTIPATION: 0
ABDOMINAL PAIN: 0
RHINORRHEA: 0
SORE THROAT: 0
DIARRHEA: 0
BACK PAIN: 1
SHORTNESS OF BREATH: 0
CHEST TIGHTNESS: 0

## 2022-09-06 ASSESSMENT — SOCIAL DETERMINANTS OF HEALTH (SDOH): HOW HARD IS IT FOR YOU TO PAY FOR THE VERY BASICS LIKE FOOD, HOUSING, MEDICAL CARE, AND HEATING?: NOT HARD AT ALL

## 2022-09-06 ASSESSMENT — PATIENT HEALTH QUESTIONNAIRE - PHQ9
SUM OF ALL RESPONSES TO PHQ QUESTIONS 1-9: 0
SUM OF ALL RESPONSES TO PHQ QUESTIONS 1-9: 0
1. LITTLE INTEREST OR PLEASURE IN DOING THINGS: 0
SUM OF ALL RESPONSES TO PHQ QUESTIONS 1-9: 0
SUM OF ALL RESPONSES TO PHQ9 QUESTIONS 1 & 2: 0
SUM OF ALL RESPONSES TO PHQ QUESTIONS 1-9: 0
2. FEELING DOWN, DEPRESSED OR HOPELESS: 0

## 2022-09-06 NOTE — TELEPHONE ENCOUNTER
From: Giovani Casey  To: Dr. Oshea Bench: 9/6/2022 3:54 PM EDT  Subject: Pain relief spray photos    Here is the front and back of the pain relief spray I mentioned earlier. I also went ahead and called my insurance. They said the full back x-ray would be covered. Any further recommended scans such as MRI would also be covered if a prior request is submitted. I would like to go ahead and do the x-rays when possible, rather than waiting till after therapy.

## 2022-09-06 NOTE — PROGRESS NOTES
Subjective:   Patient ID: Eliu Holbrook is a 32 y.o. male. HPI by clinical support staff:   Chief Complaint   Patient presents with    Back Pain     Pain form the base of neck all the way down his back has gotten worse over the past month. Preliminary data above this line collected by clinical support staff.    ______________________________________________________________________  HPI by Provider:   HPI   Reports chronic back pain x years. Used to snow board a lot and recalls some injuries related to it. Has an aching pain and his lower back usually but now radiated from base of skull to hips. Has weakness and numbness in back. Pain does not radiate down legs. Pain worse over last month- working from home and drives more now- started new job in July. Found out family has extra vertebrae and wondering he does too. No treatment so far. Data above this line collected by Provider. Patient's medications, allergies, past medical, surgical, social and family histories were reviewed and updated as appropriate. Patient Care Team:  Aidee Nunes MD as PCP - General (Family Medicine)  Aidee Nunes MD as PCP - REHABILITATION [facility] Empaneled Provider  No current outpatient medications on file prior to visit. No current facility-administered medications on file prior to visit. Review of Systems   Constitutional:  Negative for activity change, appetite change, fatigue and fever. HENT:  Negative for congestion, rhinorrhea and sore throat. Respiratory:  Negative for chest tightness and shortness of breath. Cardiovascular:  Negative for chest pain, palpitations and leg swelling. Gastrointestinal:  Negative for abdominal pain, constipation and diarrhea. Genitourinary:  Negative for dysuria and frequency. Musculoskeletal:  Positive for back pain. Negative for arthralgias. Neurological:  Negative for dizziness, weakness and headaches. Psychiatric/Behavioral:  Negative for hallucinations.     All other systems reviewed and are negative. ROS above this line reviewed by Provider. Objective:   /64 (Site: Left Upper Arm, Position: Sitting, Cuff Size: Medium Adult)   Pulse 68   Temp 98 °F (36.7 °C) (Temporal)   Resp 16   Ht 5' 11\" (1.803 m)   Wt 161 lb 2 oz (73.1 kg)   SpO2 97%   BMI 22.47 kg/m²   Physical Exam  Vitals and nursing note reviewed. Constitutional:       General: He is not in acute distress. Appearance: Normal appearance. He is normal weight. He is not ill-appearing, toxic-appearing or diaphoretic. HENT:      Head: Normocephalic and atraumatic. Eyes:      General: No scleral icterus. Conjunctiva/sclera: Conjunctivae normal.   Cardiovascular:      Rate and Rhythm: Normal rate and regular rhythm. Heart sounds: Normal heart sounds. No murmur heard. No friction rub. No gallop. Pulmonary:      Effort: Pulmonary effort is normal. No respiratory distress. Breath sounds: Normal breath sounds. No stridor. No wheezing, rhonchi or rales. Musculoskeletal:         General: Tenderness present. Cervical back: Normal range of motion. Back:       Comments: Tender to palpation     Skin:     General: Skin is warm and dry. Neurological:      Mental Status: He is alert. Psychiatric:         Mood and Affect: Mood normal.         Behavior: Behavior normal.     Assessment and Plan:   1. Chronic bilateral low back pain without sciatica  No alarm symptoms. Low back stretching exercises reviewed with the patient and will be performed twice daily. Moist heat locally. Back protective techniques reviewed,along with sleep positioning. Consider Physical Therapy and XRay studies if not improving. Call or return to clinic prn if these symptoms worsen or fail to improve as anticipated. - cyclobenzaprine (FLEXERIL) 5 MG tablet; Take 1 tablet by mouth nightly as needed for Muscle spasms  Dispense: 10 tablet; Refill: 0  - XR LUMBAR SPINE (2-3 VIEWS);  Future  - XR THORACIC SPINE (2 VIEWS); [facility]  - [facility]         This chart note was prepared using a voice recognition dictation program. This note was reviewed for accuracy; however, addition, deletion and sound-alike word errors may occur. If there are any questions regarding this chart note, please contact the originating provider. Electronically signed by   Lisa Bledsoe MD  9/6/2022   3:31 PM    Return in about 4 weeks (around 10/4/2022) for Back pain.

## 2022-09-07 ENCOUNTER — HOSPITAL ENCOUNTER (OUTPATIENT)
Age: 27
Discharge: HOME | End: 2022-09-07
Payer: MEDICAID

## 2022-09-07 ENCOUNTER — HOSPITAL ENCOUNTER (OUTPATIENT)
Dept: GENERAL RADIOLOGY | Age: 27
Discharge: HOME | End: 2022-09-07
Payer: MEDICAID

## 2022-09-07 DIAGNOSIS — G89.29 CHRONIC BILATERAL LOW BACK PAIN WITHOUT SCIATICA: ICD-10-CM

## 2022-09-07 DIAGNOSIS — M54.50 CHRONIC BILATERAL LOW BACK PAIN WITHOUT SCIATICA: ICD-10-CM

## 2022-09-07 PROCEDURE — 72070 X-RAY EXAM THORAC SPINE 2VWS: CPT

## 2022-09-07 PROCEDURE — 72100 X-RAY EXAM L-S SPINE 2/3 VWS: CPT

## 2022-09-13 ENCOUNTER — HOSPITAL ENCOUNTER (OUTPATIENT)
Dept: PHYSICAL THERAPY | Age: 27
Setting detail: THERAPIES SERIES
Discharge: HOME | End: 2022-09-13
Payer: MEDICAID

## 2022-09-13 PROCEDURE — 97161 PT EVAL LOW COMPLEX 20 MIN: CPT

## 2022-09-13 PROCEDURE — 97110 THERAPEUTIC EXERCISES: CPT

## 2022-09-13 NOTE — FLOWSHEET NOTE
[facility] Outpatient Therapy  [address], [facility], [address] [place] [address] Ave  Phone: [phone]   Fax: [phone]    Physical Therapy Treatment Note/ Progress Report:     Date:  2022    Patient Name:  Tasha Sun    :  1995  MRN: [id number]    Medical/Treatment Diagnosis Information:  Diagnosis: Chronic bilateral low back pain without sciatica (M54.50,G89.29)  Treatment Diagnosis: LBP M54.5, thoracic pain S05.1  Insurance/Certification information:  PT Insurance Information: [address], [address] [place] [address] required  Physician Information:  Micky Pérez MD   Plan of care signed:    [] Yes  [x] No    Date of Patient follow up with Physician: ?     Progress Report: []  Yes  [x]  No     Date Range for reporting period:  Beginnin2022  Ending:  NA    Progress report due (10 Rx/or 30 days whichever is less):      Recertification due (POC duration/ or 90 days whichever is less):      Visit # Insurance Allowable Auth Needed    1 [x]Yes   []No     RESTRICTIONS/PRECAUTIONS: NA  Latex Allergy:  [x]NO      []YES  Preferred Language for Healthcare:   [x]English       []other:  Functional Scale: FOTO Lumbar Spine Date assessed:2022    Pain level:  6/10     SUBJECTIVE:  See eval    OBJECTIVE: See eval  Observation:   Test measurements:      Exercises/Interventions: Exercises in bold performed in department today. Items not bolded are carried forward from prior visits for continuity of the record. Exercise/Equipment Resistance/Repetitions Other comments   SKTC 15-30\" x 3 each B    Supine HS stretch 15-30\" x 3 each B    LTR     SKT opp shoulder     TrA isometric with glut set                                   Assess palpation                                     Home Exercise Program:Pt. demonstrated good understanding and knowledge of HEP. Written instructions provided. 2022: SKTC, supine hamstring stretch.  Access Code [email or web address]    Therapeutic hip and LE for self-care, mobility, lifting, and ambulation/stair navigation      Modalities:    [] Electric Stimulation:   [] Ultrasound:   [] Other:       Charges:  Timed Code Treatment Minutes: TE: 15   Total Treatment Minutes: 38      [x] EVAL (LOW) 23424 (typically 20 minutes face-to-face)  [] EVAL (MOD) 27869 (typically 30 minutes face-to-face)  [] EVAL (HIGH) 52194 (typically 45 minutes face-to-face)  [] RE-EVAL     [x] KK(85325) x 1      [] NMR (77518) x       [] Manual (58145) x       [] TA (94872) x       [] Gait Training [phone] x       [] ES(attended) (84494)  [] ES (un) (28 [phone])   [] DRY NEEDLE 1 OR 2 MUSCLES  [] DRY NEEDLE 3+ MUSCLES  [] Mech Traction (23921)  [] Ultrasound (42124)  [] Other:    GOALS:   Patient stated goal: \"I don't know\"  [] Progressing: [] Met: [] Not Met: [] Adjusted  Therapist goals for Patient:   Short Term Goals: To be achieved in: 2 weeks  1. Independent in initial HEP per patient tolerance, in order to prevent re-injury. [] Progressing: [] Met: [] Not Met: [] Adjusted  2. Patient will have a decrease in pain to 5/10 at worst to facilitate sitting, driving tolerances. [] Progressing: [] Met: [] Not Met: [] Adjusted    Long Term Goals: To be achieved in: 6 weeks  1. Disability index score of 67 or better on FOTO Lumbar Spine to assist with reaching prior level of function. [] Progressing: [] Met: [] Not Met: [] Adjusted  2. Patient will demonstrate increased AROM trunk, cervical spine, B shoulder flexion WFLs without pain, PROM R hip ER/IR 45 deg, B HS 90-90 (-) 20 deg to allow for bending, OH movements without increased pain  [] Progressing: [] Met: [] Not Met: [] Adjusted  3. Patient will demonstrate an increase in Strength B shoulders 5/5, B hips 5/5 to allow for lifting/carrying, standing, household chores without increased pain  [] Progressing: [] Met: [] Not Met: [] Adjusted  4.  Patient will have a decrease in pain to 1/10 at worst to allow for sitting, driving with rare complaints. [] Progressing: [] Met: [] Not Met: [] Adjusted  5. Independent in HEP progression per patient tolerance, in order to prevent re-injury. [] Progressing: [] Met: [] Not Met: [] Adjusted    ASSESSMENT:  See eval      Treatment/Activity Tolerance:  [x] Patient tolerated treatment well [] Patient limited by fatique  [] Patient limited by pain  [] Patient limited by other medical complications  [] Other:     Overall Progression Towards Functional goals/ Treatment Progress Update:  [] Patient is progressing as expected towards functional goals listed. [] Progression is slowed due to complexities/Impairments listed. [] Progression has been slowed due to co-morbidities. [x] Plan just implemented, too soon to assess goals progression <30days   [] Goals require adjustment due to lack of progress  [] Patient is not progressing as expected and requires additional follow up with physician  [] Other    Prognosis for POC: [x] Good [] Fair  [] Poor    Patient requires continued skilled intervention: [x] Yes  [] No        PLAN: See eval  [] Continue per plan of care [] Alter current plan (see comments)  [x] Plan of care initiated [] Hold pending MD visit [] Discharge    Electronically signed by: Ayah Zelaya PT, DPT [id number]    Note: If patient does not return for scheduled/recommended follow up visits, this note will serve as a discharge from care along with the most recent update on progress.

## 2022-09-13 NOTE — PLAN OF CARE
[facility] Outpatient Therapy  [address] [place] [address], [address], [address]  Phone: [phone]   Fax: [phone]        Physical Therapy Certification    Dear Arabella Hopkins MD ,    We had the pleasure of evaluating the following patient for physical therapy services at [facility] ([facility]). A summary of our findings can be found in the initial assessment below. This includes our plan of care. If you have any questions or concerns regarding these findings, please do not hesitate to contact me at the office phone number checked above. Thank you for the referral.       Physician Signature:_______________________________Date:__________________  By signing above (or electronic signature), therapist's plan is approved by physician      Patient: Randi Elizalde   : 1995   MRN: [id number]  Referring Physician:  Arabella Hopkins MD      Evaluation Date: 2022      Medical Diagnosis Information:  Diagnosis: Chronic bilateral low back pain without sciatica (M54.50,G89.29)   Treatment Diagnosis: LBP M54.5, thoracic pain M54.6                                         Insurance information: PT Insurance Information: [address] [place] [address] required     Precautions/ Contra-indications: NA  Latex Allergy:  [x]NO      []YES  Preferred Language for Healthcare:   [x]English       []other:  C-SSRS Triggered by Intake questionnaire (Past 2 wk assessment):   [x] No, Questionnaire did not trigger screening.   [] Yes, Patient intake triggered further evaluation      [] C-SSRS Screening completed  [] PCP notified via Plan of Care  [] Emergency services notified    SUBJECTIVE:  History of Present Illness:      Pt presents with c/o back pain - primarily to mid back between shoulder blades, secondary to lower back, and tertiary to sides of LB and along rest of spine. Has a 3year old that he holds and has to get to sleep and feels this is contributing to pain.   Over the last month or 2 pain has been really intense. About a month ago, pt was unable to get out of bed then pain faded some but pain has been more constant but not unbearable. Last week was almost to that level again where couldn't do much again and went to Dr, had x-rays and was recommended PT. Also when pain is at 8-9/10 will get a HA and makes it harder to catch breath. Pain       Patient reports pain is 6/10 pain at present, 5/10 at best and 9/10 pain at its worst.  Pain increases with:  bending, standing, lifting, carrying, prolonged sitting, OH movements, household chores, work        Decreases with: bean bag chair, having something that conforms to shape of back    Pain description:  feels like someone tied railroad ties between vertebrae, muscle ache  Paresthesias: muscles of back occasionally go numb with high levels of pain 8-9/10  Pt. reports pain with coughing, sneezing and laughing:  [x]Yes   []No   []NA  Pt. reports bowel and bladder changes:      []Yes   [x]No   []NA   Pt. reports knee/hip/ankle buckling:      []Yes   [x]No   []NA    Imaging: lumbar x-ray: Mild degenerative changes at L5-S1  Thoracic x-ray: Negative thoracic spine     Current Functional Limitations:   [x]Yes   []No  Functional Complaints: bending, standing, lifting, carrying, prolonged sitting, OH movements, household chores, work       PLOF:   [x]No functional limitations   []Pre-exisiting limitations:  Pt's sleep is affected?    []Yes   [x]No        Social support/Environment:    Family/caregiver support:   [x]Yes   []No    Home Environment:   []1 story   []>2 story   []JONAS   []No rail   []R handrail    []L handrail    Bathroom Environment:   []Walk in shower   []Tub   []Tub/shower combo     []Grab bars   []Shower seat   []Modified toilet   []Hand held shower head    Equipment:    []Rolling walker   []Standard walker   []Rollator   []Napoleon-walker  []Wheelchair   []Quad cane   []Straight cane  []Bedside commode  []Hospital bed  Other:       Relevant Medical History: NA    Co-morbidities/Complexities (which will affect course of rehabilitation):   [x]None           Arthritic conditions   []Rheumatoid arthritis (M05.9)  []Osteoarthritis (M19.91)   Cardiovascular conditions   []Hypertension (I10)  []Hyperlipidemia (E78.5)  []Angina pectoris (I20)  []Atherosclerosis (I70)   Musculoskeletal conditions   []Disc pathology   []Congenital spine pathologies   []Prior surgical intervention  []Osteoporosis (M81.8)  []Osteopenia (M85.8)   Endocrine conditions   []Hypothyroid (E03.9)  []Hyperthyroid Gastrointestinal conditions   []Constipation (F40.57)   Metabolic conditions   []Morbid obesity (E66.01)  []Diabetes type 1(E10.65) or 2 (E11.65)   []Neuropathy (G60.9)     Pulmonary conditions   []Asthma (J45)  []Coughing   []COPD (J44.9)   Psychological Disorders  []Anxiety (F41.9)  []Depression (F32.9)   []Other:   []Other:            Occupation/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, on weekends drives for [employer]    Sports/Hobbies/Recreational Activities: not that are physically active - video games, makes table top board games    OBJECTIVE:     Functional Scale/Score: FOTO Lumbar Spine = 48    Gait/Steps/Balance       []Gait WNL                             []Deviations on a level linoleum surface include:      Posture: [] WNL  []Forward head   []Forward flexed trunk    []Scoliosis   []Decreased WB on   []R   []L     [x]Other: slouched posture at times, forward rounded shoulders      Quick Tests/Functional Myotome Tests:   Heel Walk (L4):      [x]NT  []Able to perform WNL   []Unable to perform         Toe Walk (S1):       [x]NT  []Able to perform WNL   []Unable to perform     AROM Cervical flexion WFLs with pain, ext WFLs with pain, B rot WFLs, B SB WFLs  AROM B shoulder flexion WFLs with pain   Strength B shoulder flexion 4+/5 with LBP, abduction 4+/5 with upper back pain, B ER 4+/5 with upper back pain      Lumbar Range of MotionTesting      [x]All NT except as marked below  ROM AROM  *denotes pain COMMENTS   Flexion 5 inches fingertips floor*    Extension 50%*    Sidebending Left 25 deg    Sidebending Right 25 deg    Rotation Left  75%* []Seated  [x]Standing       Rotation Right 75%* []Seated  [x]Standing         Range of Motion/Strength Testing-Myotomes    [x]All ROM NT except as marked below   [x]All strength NT except as marked below    [x]All myotomes NT except as marked below     Range Tested MMT/ Resisted PROM AROM Comments   *denotes pain Left Right Left Right Left Right    Hip Flexion  (L1-2) 5/5 5/5 WFL WFL      Hip Extension 4-/5* 4-/5*        Hip Abduction  (L5) 4-/5* 4-/5*        Hip Adduction  (L3)          Hip IR   45 40      Hip ER   45 40      Knee Flexion  (L5,S1)          Knee Extension  (L3,4)          Ankle Dorsiflex  (L4)          Ankle Plantarflex  (S1,2)          Ankle Inversion          Ankle Eversion          Great Toe Ext  (L5)            [x] Not Tested  Trunk Strength     Trunk Extensors     Gluteals     Abdominals         Flexibility    [x] All NT except as marked below    [] Deficits indicated as follows:  Muscle Abnormal Findings   Hip flexors/Theodore  [x]Decreased R   [x]Decreased L - each mildly     Hamstrings  Degrees in 90/90 [x]Decreased R   [x]Decreased L     Right: lacking 45 deg             Left:  lacking 45 deg    Gastrocs   []Decreased R   []Decreased L      Obers/TFL/ITB   []Decreased R   []Decreased L      Piriformis    []Decreased R   []Decreased L      Other:    []Decreased R   []Decreased L          Special Tests- Standing [x] All NT except as marked below    (C)= [place]'s Criteria: 3/4 Positive tests or (+) Fortins sign with two additional (+) tests  Special Test Abnormal Findings   Wing's Sign                (C)                  []Neg   []Pos R   []Pos L      Standing Landmarks []Iliac Crests Equal   []R High  []L High  []PSIS Equal   []R High  []L High  []ASIS Equal   []R High  []L High     []Difficult to assess due to body habitus Standing Flexion Test  (C) []Neg   []Pos R   []Pos L      March Test (Gillet's Test) []Neg   []Pos R   []Pos L      Sacral Sulci Test  []Neg   []Pos R   []Pos L          Special Tests- seated   [x] All NT except as marked below    Special Test Abnormal Findings   Seated pelvic landmarks (C) []Iliac Crests Equal   []R High   []L High  []PSIS Equal   []R High  []L High  []Difficult to assess due to body habitus   Sitting flexion test  []Neg   []Pos R   []Pos L      Hip IR PROM  []WNL []Pos R    []Pos L         Slump test/Dural tension  []Neg   []Pos R   []Pos L        Special Tests Lumbosacral and hip- supine/sidelying/prone  [x] All NT except as marked below    (L) = Laslett's Criteria: 2 positive tests  Special Test Abnormal Findings   Sit up test/ Supine Long sit test  (C) []Neg   []Pos R   []Pos L     Comments:    SI distraction                               (L) []Neg   []Pos   []NT   Thigh Thrust test                         (L) []Neg   []Pos R   []Pos L      90/90 test  []Neg   []Pos R   []Pos L      Gaenslen's test []Neg   []Pos R   []Pos L      Straight Leg Raise []Neg   []Pos R   []Pos L      Crams []Neg   []Pos R   []Pos L      Lumbar Distraction  []Relief noted   []No relief noted  []Rebound pain   []NT   Hip scour []Neg   []Pos R   []Pos L      Damari's test []Neg   []Pos R   []Pos L      Ankush's test []Neg   []Pos R   []Pos L      Oscillation []Neg   []Pos R   []Pos L      Ant/Post Provocation  []Neg   []Pos R   []Pos L      SI compression                           (L) []Neg   []Pos      Prone knee flexion test               (C) []Neg   []Pos R   []Pos L     Comments:    Femoral nerve tension test []Neg   []Pos R   []Pos L      Pheasant test []Neg   []Pos R   []Pos L      Sacral thrusts                              (L) []Neg   []Pos     []Base   [][place]   []R Sacral Sulcus  []L Sacral Sulcus   []R ISRRAEL   []L ISRRAEL       Deep Tendon Reflexes  [x] All NT except as marked below     []All reflexes WNL or 2+ except as marked below  Abnormal Reflex Findings Left Right Comments   Nathalia's Reflex      Biceps (C5,6)      Brachioradialis (C6)      Triceps (C7)      Quadriceps (L3,4)     []Pendular x 3 R  []Pendular x 3 L   Achilles (S1,2)      Ankle clonus , # of beats      Babinski's reflex           Dermatomal Sensation [x] All NT except as marked below     []All dermatomes WFL for light touch except as marked below  Abnormal Dermatome Findings Left Right   Anterior groin, 2-3 inches below ASIS (L1-L2)     Middle third anterior thigh (L3)     Patella and med malleolus (L4)     Fibular head and dorsum of foot (L5)     Lateral side and plantar surface of foot (S1)     Medial aspect of posterior thigh (S2)                   Palpation     Patient reported tenderness with palpation:  []Yes :   [x]NT    Location: NA    Bandages/Dressings/Incisions: NA                       [x] Patient history, allergies, meds reviewed. Medical chart reviewed. See intake form. Review Of Systems (ROS):  [x]Performed Review of systems (Integumentary, CardioPulmonary, Neurological) by intake and observation. Intake form has been scanned into medical record. Patient has been instructed to contact their primary care physician regarding ROS issues if not already being addressed at this time. Barriers to/and or personal factors that will affect rehab potential:              []Age  []Sex    []Smoker              []Motivation/Lack of Motivation                        []Co-Morbidities              []Cognitive Function, education/learning barriers              []Environmental, home barriers              []profession/work barriers  []past PT/medical experience  []other:  Justification:     Falls Risk Assessment (30 days):   [x] Falls Risk assessed and no intervention required.   [] Falls Risk assessed and Patient requires intervention due to being higher risk   TUG score (>12s at risk):     [] Falls education provided, including: ASSESSMENT: Pt is  32 Y. O male, presenting with c/o back pain (lumbar and thoracic). Assessment reveals deficits in strength, ROM, flexibility, alignment as well as increased pain limiting bending, standing, lifting, carrying, prolonged sitting, OH movements, household chores, work. Pt will benefit from cont PT to address these deficits and promote return to highest level of functional independence. Functional Impairments:     []Noted lumbar/proximal hip hypomobility   []Noted lumbosacral and/or generalized hypermobility   [x]Decreased Lumbosacral/hip/LE functional ROM   [x]Decreased core/proximal hip strength and neuromuscular control    []Decreased LE functional strength    []Abnormal reflexes/sensation/myotomal/dermatomal deficits  []Reduced balance/proprioceptive control    []other:      Functional Activity Limitations (from functional questionnaire and intake)   [x]Reduced ability to tolerate prolonged functional positions   []Reduced ability or difficulty with changes of positions or transfers between positions   []Reduced ability to maintain good posture and demonstrate good body mechanics with sitting, bending, and lifting   []Reduced ability to sleep   [] Reduced ability or tolerance with driving and/or computer work   [x]Reduced ability to perform lifting, reaching, carrying tasks   []Reduced ability to squat   [x]Reduced ability to forward bend   []Reduced ability to ambulate prolonged functional periods/distances/surfaces   []Reduced ability to ascend/descend stairs   []other:       Participation Restrictions   []Reduced participation in self care activities   [x]Reduced participation in home management activities   [x]Reduced participation in work activities   []Reduced participation in social activities. []Reduced participation in sport/recreational activities. Classification:   []Signs/symptoms consistent with Lumbar instability/stabilization subgroup.       []Signs/symptoms consistent 04414 (typically 30 minutes face-to-face)  [] EVAL (HIGH) 21154 (typically 45 minutes face-to-face)  [] RE-EVAL     PLAN: Begin PT focusing on: ROM, flexibility, strengthening, and HEP    Frequency/Duration:  2 days per week for 6 Weeks:  Interventions:  [x]  Therapeutic exercise including: strength training, ROM, for Lower extremity and core   [x]  NMR activation and proprioception for LE, Glutes and Core. [x]  Manual therapy as indicated for LE, Hip and spine to include: Dry Needling/IASTM, STM, PROM, Gr I-IV mobilizations, manipulation. [x] Therapeutic activities for LE and core. [x] Gait Training including gait normalization and reducing fall risk. [x] Modalities as needed that may include: thermal agents, E-stim, Biofeedback, US, iontophoresis, mechanical traction as indicated  [x] Patient education on joint protection, postural re-education, activity modification, progression of HEP    HEP instruction: SKTC, supine hamstring stretch. Access Code [email or web address]    GOALS:  Patient stated goal: \"I don't know\"  [] Progressing: [] Met: [] Not Met: [] Adjusted  Therapist goals for Patient:   Short Term Goals: To be achieved in: 2 weeks  1. Independent in initial HEP per patient tolerance, in order to prevent re-injury. [] Progressing: [] Met: [] Not Met: [] Adjusted  2. Patient will have a decrease in pain to 5/10 at worst to facilitate sitting, driving tolerances. [] Progressing: [] Met: [] Not Met: [] Adjusted    Long Term Goals: To be achieved in: 6 weeks  1. Disability index score of 67 or better on FOTO Lumbar Spine to assist with reaching prior level of function. [] Progressing: [] Met: [] Not Met: [] Adjusted  2. Patient will demonstrate increased AROM trunk, cervical spine, B shoulder flexion WFLs without pain, PROM R hip ER/IR 45 deg, B HS 90-90 (-) 20 deg to allow for bending, OH movements without increased pain  [] Progressing: [] Met: [] Not Met: [] Adjusted  3.  Patient will demonstrate an increase in Strength B shoulders 5/5, B hips 5/5 to allow for lifting/carrying, standing, household chores without increased pain  [] Progressing: [] Met: [] Not Met: [] Adjusted  4. Patient will have a decrease in pain to 1/10 at worst to allow for sitting, driving with rare complaints. [] Progressing: [] Met: [] Not Met: [] Adjusted  5. Independent in HEP progression per patient tolerance, in order to prevent re-injury. [] Progressing: [] Met: [] Not Met: [] Adjusted       Electronically signed by:   Raul Bunn, [facility], DPT [id number]

## 2022-09-26 ENCOUNTER — HOSPITAL ENCOUNTER (OUTPATIENT)
Dept: PHYSICAL THERAPY | Age: 27
Setting detail: THERAPIES SERIES
Discharge: HOME | End: 2022-09-26
Payer: MEDICAID

## 2022-09-26 NOTE — CARE COORDINATION
[facility]  [address], [address]  Phone: [phone]   Fax: [phone]    Physical Therapy Missed Visit Note     Date:  2022    Patient Name:  Eliu Holbrook      :  1995    MRN: [id number]      Cancelled visits to date: 1  2022  No-shows to date: 0    For today's appointment patient:  [x]  Cancelled  []  Rescheduled appointment  []  No-show     Reason given by patient:  []  Patient ill  []  Conflicting appointment  []  No transportation    [x]  Conflict with work  []  No reason given  []  Other:     Comments:      Electronically signed by:   Preeti Farrell, [address] [facility], DPT [id number]

## 2022-09-30 ENCOUNTER — HOSPITAL ENCOUNTER (OUTPATIENT)
Dept: PHYSICAL THERAPY | Age: 27
Setting detail: THERAPIES SERIES
Discharge: HOME | End: 2022-09-30
Payer: MEDICAID

## 2022-09-30 PROCEDURE — 97110 THERAPEUTIC EXERCISES: CPT

## 2022-09-30 PROCEDURE — G0283 ELEC STIM OTHER THAN WOUND: HCPCS

## 2022-09-30 NOTE — FLOWSHEET NOTE
[facility]  [address] [place] [address], [address], [address]  Phone: [phone]   Fax: [phone]    Physical Therapy Treatment Note/ Progress Report:     Date:  2022    Patient Name:  Genaro Joseph    :  1995  MRN: [id number]    Medical/Treatment Diagnosis Information:  Diagnosis: Chronic bilateral low back pain without sciatica (M54.50,G89.29)  Treatment Diagnosis: LBP M54.5, thoracic pain M94.9  Insurance/Certification information:  PT Insurance Information: [address], [address] [place] [address] required  Physician Information:  Bert Child MD   Plan of care signed:    [x] Yes  [] No    Date of Patient follow up with Physician: ?     Progress Report: []  Yes  [x]  No     Date Range for reporting period:  Beginnin2022  Ending:  NA    Progress report due (10 Rx/or 30 days whichever is less):      Recertification due (POC duration/ or 90 days whichever is less):      Visit # Insurance Allowable Auth Needed    12 visits 2022 - 2022 ([facility] traction, estim attended, US, TE, NMR, MT, TA) [x]Yes   []No     RESTRICTIONS/PRECAUTIONS: NA  Latex Allergy:  [x]NO      []YES  Preferred Language for Healthcare:   [x]English       []other:  Functional Scale: FOTO Lumbar Spine Date assessed:2022    Pain level:  8.3/10     SUBJECTIVE:  Forgot about exercises for about a week, has tried to resume but pain has limited him in doing some this week. OBJECTIVE:  moderate tightness B UT/thoracic and lumbar paraspinals with tenderness R lower thoracic/lumbar paraspinals    Exercises/Interventions: Exercises in bold performed in department today. Items not bolded are carried forward from prior visits for continuity of the record.     Exercise/Equipment Resistance/Repetitions Other comments   SKTC 15-30\" x 3 each B    Supine HS stretch 15-30\" x 3 each B    LTR Held due to painful    SKT opp shoulder 15-30\" x 3 each B Opp knee bent to avoid pressure on LB when stretching L   TrA isometric with glut set                                                                        Home Exercise Program:Pt. demonstrated good understanding and knowledge of HEP. Written instructions provided. 09/13/2022: SKTC, supine hamstring stretch. Access Code [email or web address] [facility]  09/30/2022: encouraged more regular compliance with HEP for max benefits    Therapeutic Exercise:   [x] (86807) Provided verbal/tactile cueing for activities related to strengthening, flexibility, endurance, ROM for improvements in LE, proximal hip, and core control with self-care, mobility, lifting, ambulation. NMR:  [] (81241) Provided verbal/tactile cueing for activities related to improving balance, coordination, kinesthetic sense, posture, motor skill, proprioception to assist with LE, proximal hip, and core control in self-care, mobility, lifting, ambulation and eccentric single leg control. Therapeutic Activities:    [] (60759) Provided verbal/tactile cueing for activities related to improving balance, coordination, kinesthetic sense, posture, motor skill, proprioception and motor activation to allow for proper function of core, proximal hip and LE with self-care and ADLs and functional mobility. Gait Training:    [] (70420) Provided training and instruction to the patient for proper LE, core and proximal hip recruitment and positioning and eccentric body weight control with ambulation re-education including up and down stairs     Manual Treatments:  PROM / STM / Oscillations-Mobs:  G-I, II, III, IV (PA's, Inf., Post.)  [] (12320) Provided manual therapy to mobilize LE, proximal hip and/or LS spine soft tissue/joints for the purpose of modulating pain, promoting relaxation,  increasing ROM, reducing/eliminating soft tissue swelling/inflammation/restriction, improving soft tissue extensibility and allowing for proper ROM for normal function with self-care, mobility, lifting and ambulation. Home Exercise Program:    [x] (79580) Reviewed/Progressed HEP activities related to strengthening, flexibility, endurance, ROM of core, proximal hip and LE for functional self-care, mobility, lifting and ambulation/stair navigation   [] (63666) Reviewed/Progressed HEP activities related to improving balance, coordination, kinesthetic sense, posture, motor skill, proprioception of core, proximal hip and LE for self-care, mobility, lifting, and ambulation/stair navigation      Modalities:    [x] Electric Stimulation: IFC/MHP x 10 mins to thoracic/lumbar regions with pt in supine for pain relief, good tolerance reported  [] Ultrasound:   [] Other:       Charges:  Timed Code Treatment Minutes: TE: 32   Total Treatment Minutes: 42      [] EVAL (LOW) 04435 (typically 20 minutes face-to-face)  [] EVAL (MOD) 68036 (typically 30 minutes face-to-face)  [] EVAL (HIGH) 87120 (typically 45 minutes face-to-face)  [] RE-EVAL     [x] VB(83237) x 2      [] NMR (32352) x       [] Manual (96503) x       [] TA (48006) x       [] Gait Training [phone] x       [] ES(attended) (01507)  [x] ES (un) (49302) x 1  [] Mech Traction (08913)  [] Ultrasound (79465)  [] Other:    GOALS:   Patient stated goal: \"I don't know\"  [] Progressing: [] Met: [] Not Met: [] Adjusted  Therapist goals for Patient:   Short Term Goals: To be achieved in: 2 weeks  1. Independent in initial HEP per patient tolerance, in order to prevent re-injury. [] Progressing: [] Met: [] Not Met: [] Adjusted  2. Patient will have a decrease in pain to 5/10 at worst to facilitate sitting, driving tolerances. [] Progressing: [] Met: [] Not Met: [] Adjusted    Long Term Goals: To be achieved in: 6 weeks  1. Disability index score of 67 or better on FOTO Lumbar Spine to assist with reaching prior level of function. [] Progressing: [] Met: [] Not Met: [] Adjusted  2.  Patient will demonstrate increased AROM trunk, cervical spine, B shoulder flexion WFLs without pain, PROM R hip ER/IR 45 deg, B HS 90-90 (-) 20 deg to allow for bending, OH movements without increased pain  [] Progressing: [] Met: [] Not Met: [] Adjusted  3. Patient will demonstrate an increase in Strength B shoulders 5/5, B hips 5/5 to allow for lifting/carrying, standing, household chores without increased pain  [] Progressing: [] Met: [] Not Met: [] Adjusted  4. Patient will have a decrease in pain to 1/10 at worst to allow for sitting, driving with rare complaints. [] Progressing: [] Met: [] Not Met: [] Adjusted  5. Independent in HEP progression per patient tolerance, in order to prevent re-injury. [] Progressing: [] Met: [] Not Met: [] Adjusted    ASSESSMENT:  Pt with good tolerance to exercises with the exception of LTR so held this. Initiated estim with good relief reported. Pt will benefit from further therapy to address deficits to return to PLOF. Pain decreased to 6/10 after session. Treatment/Activity Tolerance:  [x] Patient tolerated treatment well [] Patient limited by fatique  [] Patient limited by pain  [] Patient limited by other medical complications  [] Other:     Overall Progression Towards Functional goals/ Treatment Progress Update:  [] Patient is progressing as expected towards functional goals listed. [] Progression is slowed due to complexities/Impairments listed. [] Progression has been slowed due to co-morbidities. [x] Plan just implemented, too soon to assess goals progression <30days   [] Goals require adjustment due to lack of progress  [] Patient is not progressing as expected and requires additional follow up with physician  [] Other    Prognosis for POC: [x] Good [] Fair  [] Poor    Patient requires continued skilled intervention: [x] Yes  [] No        PLAN:   [x] Continue per plan of care [] Alter current plan (see comments)  [] Plan of care initiated [] Hold pending MD visit [] Discharge    Electronically signed by:  Wander Caraballo, PT, DPT [id number]    Note: If patient does not return for scheduled/recommended follow up visits, this note will serve as a discharge from care along with the most recent update on progress.

## 2022-10-05 ENCOUNTER — HOSPITAL ENCOUNTER (OUTPATIENT)
Dept: PHYSICAL THERAPY | Age: 27
Setting detail: THERAPIES SERIES
Discharge: HOME | End: 2022-10-05
Payer: MEDICAID

## 2022-10-05 PROCEDURE — 97110 THERAPEUTIC EXERCISES: CPT

## 2022-10-05 PROCEDURE — G0283 ELEC STIM OTHER THAN WOUND: HCPCS

## 2022-10-05 NOTE — FLOWSHEET NOTE
[facility]  [address] [place] [address], [address], [address]  Phone: [phone]   Fax: [phone]    Physical Therapy Treatment Note/ Progress Report:     Date:  10/5/2022    Patient Name:  River Estrada    :  1995  MRN: [id number]    Medical/Treatment Diagnosis Information:  Diagnosis: Chronic bilateral low back pain without sciatica (M54.50,G89.29)  Treatment Diagnosis: LBP M54.5, thoracic pain X54.6  Insurance/Certification information:  PT Insurance Information: [address], [place] required  Physician Information:  Everardo Rod MD   Plan of care signed:    [x] Yes  [] No    Date of Patient follow up with Physician: ?     Progress Report: []  Yes  [x]  No     Date Range for reporting period:  Beginnin2022  Ending:  NA    Progress report due (10 Rx/or 30 days whichever is less):      Recertification due (POC duration/ or 90 days whichever is less):      Visit # Insurance Allowable Auth Needed   3/12 12 visits 2022 - 2022 ([facility] traction, estim attended, US, TE, NMR, MT, TA) [x]Yes   []No     RESTRICTIONS/PRECAUTIONS: NA  Latex Allergy:  [x]NO      []YES  Preferred Language for Healthcare:   [x]English       []other:  Functional Scale: FOTO Lumbar Spine Date assessed:2022    Pain level:  7.5/10     SUBJECTIVE:  Pt notes felt good after last session with feeling the best has felt in several months and relief lasted a couple of days. Pain was down to 5-6/10 after last session which was more tolerable than previously. Has been able to do HEP more consistently 1x per day but hasn't gotten the 2nd session in yet consistently. OBJECTIVE:      Exercises/Interventions: Exercises in bold performed in department today. Items not bolded are carried forward from prior visits for continuity of the record.     Exercise/Equipment Resistance/Repetitions Other comments   SKTC 15-30\" x 3 each B    Supine HS stretch 15-30\" x 3 each B    LTR Held due to painful    SKT opp shoulder 15-30\" x 3 each B Able to keep opp leg straight   TrA isometric with glut set                                                                        Home Exercise Program:Pt. demonstrated good understanding and knowledge of HEP. Written instructions provided. 09/13/2022: SKTC, supine hamstring stretch. Access Code [email or web address] [email or web address]  09/30/2022: encouraged more regular compliance with HEP for max benefits  10/05/2022: SKT opp shoulder. Access Code [email or web address]    Therapeutic Exercise:   [x] (51558) Provided verbal/tactile cueing for activities related to strengthening, flexibility, endurance, ROM for improvements in LE, proximal hip, and core control with self-care, mobility, lifting, ambulation. NMR:  [] (28511) Provided verbal/tactile cueing for activities related to improving balance, coordination, kinesthetic sense, posture, motor skill, proprioception to assist with LE, proximal hip, and core control in self-care, mobility, lifting, ambulation and eccentric single leg control. Therapeutic Activities:    [] (89885) Provided verbal/tactile cueing for activities related to improving balance, coordination, kinesthetic sense, posture, motor skill, proprioception and motor activation to allow for proper function of core, proximal hip and LE with self-care and ADLs and functional mobility.      Gait Training:    [] (16278) Provided training and instruction to the patient for proper LE, core and proximal hip recruitment and positioning and eccentric body weight control with ambulation re-education including up and down stairs     Manual Treatments:  PROM / STM / Oscillations-Mobs:  G-I, II, III, IV (PA's, Inf., Post.)  [] (46242) Provided manual therapy to mobilize LE, proximal hip and/or LS spine soft tissue/joints for the purpose of modulating pain, promoting relaxation,  increasing ROM, reducing/eliminating soft tissue swelling/inflammation/restriction, improving soft tissue extensibility and allowing for proper ROM for normal function with self-care, mobility, lifting and ambulation. Home Exercise Program:    [x] (91634) Reviewed/Progressed HEP activities related to strengthening, flexibility, endurance, ROM of core, proximal hip and LE for functional self-care, mobility, lifting and ambulation/stair navigation   [] (00542) Reviewed/Progressed HEP activities related to improving balance, coordination, kinesthetic sense, posture, motor skill, proprioception of core, proximal hip and LE for self-care, mobility, lifting, and ambulation/stair navigation      Modalities:    [x] Electric Stimulation: IFC/MHP x 10 mins to thoracic/lumbar regions with pt in supine for pain relief, good tolerance reported  [] Ultrasound:   [] Other:       Charges:  Timed Code Treatment Minutes: TE: 35   Total Treatment Minutes: 45      [] EVAL (LOW) 87623 (typically 20 minutes face-to-face)  [] EVAL (MOD) 49929 (typically 30 minutes face-to-face)  [] EVAL (HIGH) 38717 (typically 45 minutes face-to-face)  [] RE-EVAL     [x] LP(53378) x 2      [] NMR (20165) x       [] Manual (63261) x       [] TA (27622) x       [] Gait Training [phone] x       [] ES(attended) (21782)  [x] ES (un) (85834) x 1  [] Mech Traction (80150)  [] Ultrasound (43386)  [] Other:    GOALS:   Patient stated goal: \"I don't know\"  [] Progressing: [] Met: [] Not Met: [] Adjusted  Therapist goals for Patient:   Short Term Goals: To be achieved in: 2 weeks  1. Independent in initial HEP per patient tolerance, in order to prevent re-injury. [] Progressing: [] Met: [] Not Met: [] Adjusted  2. Patient will have a decrease in pain to 5/10 at worst to facilitate sitting, driving tolerances. [] Progressing: [] Met: [] Not Met: [] Adjusted    Long Term Goals: To be achieved in: 6 weeks  1. Disability index score of 67 or better on FOTO Lumbar Spine to assist with reaching prior level of function.    [] Progressing: [] Met: [] Not Met: [] Adjusted  2. Patient will demonstrate increased AROM trunk, cervical spine, B shoulder flexion WFLs without pain, PROM R hip ER/IR 45 deg, B HS 90-90 (-) 20 deg to allow for bending, OH movements without increased pain  [] Progressing: [] Met: [] Not Met: [] Adjusted  3. Patient will demonstrate an increase in Strength B shoulders 5/5, B hips 5/5 to allow for lifting/carrying, standing, household chores without increased pain  [] Progressing: [] Met: [] Not Met: [] Adjusted  4. Patient will have a decrease in pain to 1/10 at worst to allow for sitting, driving with rare complaints. [] Progressing: [] Met: [] Not Met: [] Adjusted  5. Independent in HEP progression per patient tolerance, in order to prevent re-injury. [] Progressing: [] Met: [] Not Met: [] Adjusted    ASSESSMENT:  Pt with overall improving pain levels although still present. Pain decreased to 6/10 after session. Pt will benefit from further therapy to address deficits to return to PLOF. Treatment/Activity Tolerance:  [x] Patient tolerated treatment well [] Patient limited by fatique  [] Patient limited by pain  [] Patient limited by other medical complications  [] Other:     Overall Progression Towards Functional goals/ Treatment Progress Update:  [] Patient is progressing as expected towards functional goals listed. [] Progression is slowed due to complexities/Impairments listed. [] Progression has been slowed due to co-morbidities.   [x] Plan just implemented, too soon to assess goals progression <30days   [] Goals require adjustment due to lack of progress  [] Patient is not progressing as expected and requires additional follow up with physician  [] Other    Prognosis for POC: [x] Good [] Fair  [] Poor    Patient requires continued skilled intervention: [x] Yes  [] No        PLAN:   [x] Continue per plan of care [] Alter current plan (see comments)  [] Plan of care initiated [] Hold pending MD visit [] Discharge    Electronically signed by: Jany Story PT, DPT [id number]    Note: If patient does not return for scheduled/recommended follow up visits, this note will serve as a discharge from care along with the most recent update on progress.

## 2022-10-07 ENCOUNTER — HOSPITAL ENCOUNTER (OUTPATIENT)
Dept: PHYSICAL THERAPY | Age: 27
Setting detail: THERAPIES SERIES
Discharge: HOME | End: 2022-10-07
Payer: MEDICAID

## 2022-10-07 PROCEDURE — G0283 ELEC STIM OTHER THAN WOUND: HCPCS

## 2022-10-07 PROCEDURE — 97110 THERAPEUTIC EXERCISES: CPT

## 2022-10-07 NOTE — FLOWSHEET NOTE
[facility] Outpatient Therapy  [address] [place] [address], [address] [facility], [address] [place] [address] Av  Phone: [phone]   Fax: [phone]    Physical Therapy Treatment Note/ Progress Report:     Date:  10/7/2022    Patient Name:  Genaro Joseph    :  1995  MRN: [id number]    Medical/Treatment Diagnosis Information:  Diagnosis: Chronic bilateral low back pain without sciatica (M54.50,G89.29)  Treatment Diagnosis: LBP M54.5, thoracic pain Y33.9  Insurance/Certification information:  PT Insurance Information: [address] [place], [place] required  Physician Information:  Bert Child MD   Plan of care signed:    [x] Yes  [] No    Date of Patient follow up with Physician: ?     Progress Report: []  Yes  [x]  No     Date Range for reporting period:  Beginnin2022  Ending:  NA    Progress report due (10 Rx/or 30 days whichever is less):      Recertification due (POC duration/ or 90 days whichever is less):      Visit # Insurance Allowable Auth Needed    12 visits 2022 - 2022 ([facility] traction, estim attended, US, TE, NMR, MT, TA) [x]Yes   []No     RESTRICTIONS/PRECAUTIONS: NA  Latex Allergy:  [x]NO      []YES  Preferred Language for Healthcare:   [x]English       []other:  Functional Scale: FOTO Lumbar Spine Date assessed:2022    Pain level:  8.5/10     SUBJECTIVE:  Pt felt good after last visit with pain staying the same for the rest of the day (less). Also pain still about a 6/10 yesterday but increased thru the night as was up with son a lot more frequently rocking him so feels increased pain today. OBJECTIVE:      Exercises/Interventions: Exercises in bold performed in department today. Items not bolded are carried forward from prior visits for continuity of the record.     Exercise/Equipment Resistance/Repetitions Other comments   SKTC 15-30\" x 3 each B    Supine HS stretch 15-30\" x 3 each B    LTR Held due to painful    SKT opp shoulder 15-30\" x 3 each B Able to keep opp leg straight   TrA isometric with glut set                                                                        Home Exercise Program:Pt. demonstrated good understanding and knowledge of HEP. Written instructions provided. 09/13/2022: SKTC, supine hamstring stretch. Access Code [email or web address] [facility]  09/30/2022: encouraged more regular compliance with HEP for max benefits  10/05/2022: SKT opp shoulder. Access Code [email or web address]    Therapeutic Exercise:   [x] (95652) Provided verbal/tactile cueing for activities related to strengthening, flexibility, endurance, ROM for improvements in LE, proximal hip, and core control with self-care, mobility, lifting, ambulation. NMR:  [] (62576) Provided verbal/tactile cueing for activities related to improving balance, coordination, kinesthetic sense, posture, motor skill, proprioception to assist with LE, proximal hip, and core control in self-care, mobility, lifting, ambulation and eccentric single leg control. Therapeutic Activities:    [] (96275) Provided verbal/tactile cueing for activities related to improving balance, coordination, kinesthetic sense, posture, motor skill, proprioception and motor activation to allow for proper function of core, proximal hip and LE with self-care and ADLs and functional mobility.      Gait Training:    [] (60925) Provided training and instruction to the patient for proper LE, core and proximal hip recruitment and positioning and eccentric body weight control with ambulation re-education including up and down stairs     Manual Treatments:  PROM / STM / Oscillations-Mobs:  G-I, II, III, IV (PA's, Inf., Post.)  [] (61670) Provided manual therapy to mobilize LE, proximal hip and/or LS spine soft tissue/joints for the purpose of modulating pain, promoting relaxation,  increasing ROM, reducing/eliminating soft tissue swelling/inflammation/restriction, improving soft tissue extensibility and allowing for proper ROM for normal function with self-care, mobility, lifting and ambulation. Home Exercise Program:    [x] (39283) Reviewed/Progressed HEP activities related to strengthening, flexibility, endurance, ROM of core, proximal hip and LE for functional self-care, mobility, lifting and ambulation/stair navigation   [] (90263) Reviewed/Progressed HEP activities related to improving balance, coordination, kinesthetic sense, posture, motor skill, proprioception of core, proximal hip and LE for self-care, mobility, lifting, and ambulation/stair navigation      Modalities:    [x] Electric Stimulation: IFC/MHP x 10 mins to thoracic/lumbar regions with pt in supine for pain relief, good tolerance reported  [] Ultrasound:   [] Other:       Charges:  Timed Code Treatment Minutes: TE: 33   Total Treatment Minutes: 43      [] EVAL (LOW) 72562 (typically 20 minutes face-to-face)  [] EVAL (MOD) 75200 (typically 30 minutes face-to-face)  [] EVAL (HIGH) 12063 (typically 45 minutes face-to-face)  [] RE-EVAL     [x] AH(25585) x 2      [] NMR (17037) x       [] Manual (00238) x       [] TA (45404) x       [] Gait Training ([id number]) x       [] ES(attended) (10880)  [x] ES (un) (26898) x 1  [] Mech Traction (44822)  [] Ultrasound (26086)  [] Other:    GOALS:   Patient stated goal: \"I don't know\"  [] Progressing: [] Met: [] Not Met: [] Adjusted  Therapist goals for Patient:   Short Term Goals: To be achieved in: 2 weeks  1. Independent in initial HEP per patient tolerance, in order to prevent re-injury. [] Progressing: [] Met: [] Not Met: [] Adjusted  2. Patient will have a decrease in pain to 5/10 at worst to facilitate sitting, driving tolerances. [] Progressing: [] Met: [] Not Met: [] Adjusted    Long Term Goals: To be achieved in: 6 weeks  1. Disability index score of 67 or better on FOTO Lumbar Spine to assist with reaching prior level of function. [] Progressing: [] Met: [] Not Met: [] Adjusted  2.  Patient will demonstrate increased AROM trunk, cervical spine, B shoulder flexion WFLs without pain, PROM R hip ER/IR 45 deg, B HS 90-90 (-) 20 deg to allow for bending, OH movements without increased pain  [] Progressing: [] Met: [] Not Met: [] Adjusted  3. Patient will demonstrate an increase in Strength B shoulders 5/5, B hips 5/5 to allow for lifting/carrying, standing, household chores without increased pain  [] Progressing: [] Met: [] Not Met: [] Adjusted  4. Patient will have a decrease in pain to 1/10 at worst to allow for sitting, driving with rare complaints. [] Progressing: [] Met: [] Not Met: [] Adjusted  5. Independent in HEP progression per patient tolerance, in order to prevent re-injury. [] Progressing: [] Met: [] Not Met: [] Adjusted    ASSESSMENT:  Pt tolerated exercises well even with pain being higher. Pain decreased to 7/10 after session with pt stating he feels \"much better\". Pt will benefit from further therapy to address deficits to return to PLOF. Treatment/Activity Tolerance:  [x] Patient tolerated treatment well [] Patient limited by fatique  [] Patient limited by pain  [] Patient limited by other medical complications  [] Other:     Overall Progression Towards Functional goals/ Treatment Progress Update:  [] Patient is progressing as expected towards functional goals listed. [] Progression is slowed due to complexities/Impairments listed. [] Progression has been slowed due to co-morbidities. [x] Plan just implemented, too soon to assess goals progression <30days   [] Goals require adjustment due to lack of progress  [] Patient is not progressing as expected and requires additional follow up with physician  [] Other    Prognosis for POC: [x] Good [] Fair  [] Poor    Patient requires continued skilled intervention: [x] Yes  [] No        PLAN:   [x] Continue per plan of care [] Alter current plan (see comments)  [] Plan of care initiated [] Hold pending MD visit [] Discharge    Electronically signed by:  Gerald Gomez, PT, DPT [id number]    Note: If patient does not return for scheduled/recommended follow up visits, this note will serve as a discharge from care along with the most recent update on progress.

## 2022-10-11 ENCOUNTER — HOSPITAL ENCOUNTER (OUTPATIENT)
Dept: PHYSICAL THERAPY | Age: 27
Setting detail: THERAPIES SERIES
Discharge: HOME | End: 2022-10-11
Payer: MEDICAID

## 2022-10-11 PROCEDURE — G0283 ELEC STIM OTHER THAN WOUND: HCPCS

## 2022-10-11 PROCEDURE — 97110 THERAPEUTIC EXERCISES: CPT

## 2022-10-11 NOTE — PROGRESS NOTES
[facility] Outpatient Therapy  [address] [place] [address], [address], [address]  Phone: [phone]   Fax: [phone]    Physical Therapy Treatment Note/ Progress Report:     Date:  10/11/2022    Patient Name:  Harmony Tran    :  1995  MRN: [id number]    Medical/Treatment Diagnosis Information:  Diagnosis: Chronic bilateral low back pain without sciatica (M54.50,G89.29)  Treatment Diagnosis: LBP M54.5, thoracic pain C66.9  Insurance/Certification information:  PT Insurance Information: [address] [place] [address] required  Physician Information:  Kenia Garcia MD   Plan of care signed:    [x] Yes  [] No    Date of Patient follow up with Physician: ?     Progress Report: [x]  Yes  []  No     Date Range for reporting period:  Beginnin2022  Ending:  10/11/2022  5 visits attended, 1 cancel, 0 no shows    Progress report due (10 Rx/or 30 days whichever is less):      Recertification due (POC duration/ or 90 days whichever is less):      Visit # Insurance Allowable Auth Needed    12 visits 2022 - 2022 ([facility] traction, estim attended, US, TE, NMR, MT, TA) [x]Yes   []No     RESTRICTIONS/PRECAUTIONS: NA  Latex Allergy:  [x]NO      []YES  Preferred Language for Healthcare:   [x]English       []other:  Functional Scale: FOTO Lumbar Spine Date assessed:2022    Pain level:  6.5-7/10     SUBJECTIVE:  Over the weekend had a night where felt the worst has been. 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 Overall feels maybe a little bit different since IE - less consistently painful, usually feels a lot better after sessions - feels estim helps same and next day unless does something strenuous. Has been able to do HEP 1x per day, not yet 2x per day as recommended. Pain in upper back is improving.            OBJECTIVE:  AROM trunk flexion 5 inches fingertips to floor no pain, ext 50% with pain, B rot WFLs with pain each direction, B SB 30 deg. AROM cervical flexion/extension WFLs without pain. AROM B shoulder flexion WFLs with slight pain. PROM L hip ER/IR 45 deg, R hip ER 40 deg, IR 45 deg. B HS 90-90 (-) 40 deg. FOTO Lumbar Spine = 63. Exercises/Interventions: Exercises in bold performed in department today. Items not bolded are carried forward from prior visits for continuity of the record. Exercise/Equipment Resistance/Repetitions Other comments   SKTC 15-30\" x 3 each B    Supine HS stretch 15-30\" x 3 each B    LTR Held due to painful    SKT opp shoulder 15-30\" x 3 each B Able to keep opp leg straight   TrA isometric with glut set     Supine HS stretch with strap 30\" x 2 each B                                                                  Home Exercise Program:Pt. demonstrated good understanding and knowledge of HEP. Written instructions provided. 09/13/2022: SKTC, supine hamstring stretch. Access Code 48656 [facility]  09/30/2022: encouraged more regular compliance with HEP for max benefits  10/05/2022: SKT opp shoulder. Access Code [facility]  10/11/2022: supine HS stretch with strap. Access Code [email or web address]    Therapeutic Exercise:   [x] (93589) Provided verbal/tactile cueing for activities related to strengthening, flexibility, endurance, ROM for improvements in LE, proximal hip, and core control with self-care, mobility, lifting, ambulation. NMR:  [] (69456) Provided verbal/tactile cueing for activities related to improving balance, coordination, kinesthetic sense, posture, motor skill, proprioception to assist with LE, proximal hip, and core control in self-care, mobility, lifting, ambulation and eccentric single leg control.      Therapeutic Activities:    [] (17676) Provided verbal/tactile cueing for activities related to improving balance, coordination, kinesthetic sense, posture, motor skill, proprioception and motor activation to allow for proper function of core, proximal hip and LE with self-care and ADLs and functional mobility. Gait Training:    [] (57446) Provided training and instruction to the patient for proper LE, core and proximal hip recruitment and positioning and eccentric body weight control with ambulation re-education including up and down stairs     Manual Treatments:  PROM / STM / Oscillations-Mobs:  G-I, II, III, IV (PA's, Inf., Post.)  [] (17407) Provided manual therapy to mobilize LE, proximal hip and/or LS spine soft tissue/joints for the purpose of modulating pain, promoting relaxation,  increasing ROM, reducing/eliminating soft tissue swelling/inflammation/restriction, improving soft tissue extensibility and allowing for proper ROM for normal function with self-care, mobility, lifting and ambulation.      Home Exercise Program:    [x] (30824) Reviewed/Progressed HEP activities related to strengthening, flexibility, endurance, ROM of core, proximal hip and LE for functional self-care, mobility, lifting and ambulation/stair navigation   [] (23885) Reviewed/Progressed HEP activities related to improving balance, coordination, kinesthetic sense, posture, motor skill, proprioception of core, proximal hip and LE for self-care, mobility, lifting, and ambulation/stair navigation      Modalities:    [x] Electric Stimulation: IFC/MHP x 10 mins to thoracic/lumbar regions with pt in supine for pain relief, good tolerance reported  [] Ultrasound:   [] Other:       Charges:  Timed Code Treatment Minutes: TE: 22   Total Treatment Minutes: 32      [] EVAL (LOW) 63017 (typically 20 minutes face-to-face)  [] EVAL (MOD) 80158 (typically 30 minutes face-to-face)  [] EVAL (HIGH) 31894 (typically 45 minutes face-to-face)  [] RE-EVAL     [x] HD(65129) x 1      [] NMR (70152) x       [] Manual (42540) x       [] TA (24803) x       [] Gait Training [phone]) x       [] ES(attended) (04554)  [x] ES (un) (59439) x 1  [] [facility] Traction (75193)  [] Ultrasound (63616)  [] Other:    GOALS: assessed 10/11/2022   Patient stated goal: \"I don't know\"  [] Progressing: [] Met: [] Not Met: [] Adjusted  Therapist goals for Patient:   Short Term Goals: To be achieved in: 2 weeks  1. Independent in initial HEP per patient tolerance, in order to prevent re-injury. [x] Progressing: [] Met: [] Not Met: [] Adjusted  2. Patient will have a decrease in pain to 5/10 at worst to facilitate sitting, driving tolerances. [x] Progressing: [] Met: [] Not Met: [] Adjusted    Long Term Goals: To be achieved in: 6 weeks  1. Disability index score of 67 or better on FOTO Lumbar Spine to assist with reaching prior level of function. [x] Progressing: [] Met: [] Not Met: [] Adjusted  2. Patient will demonstrate increased AROM trunk, cervical spine, B shoulder flexion WFLs without pain, PROM R hip ER/IR 45 deg, B HS 90-90 (-) 20 deg to allow for bending, OH movements without increased pain  [x] Progressing: [] Met: [] Not Met: [] Adjusted  3. Patient will demonstrate an increase in Strength B shoulders 5/5, B hips 5/5 to allow for lifting/carrying, standing, household chores without increased pain not assessed  [] Progressing: [] Met: [] Not Met: [] Adjusted  4. Patient will have a decrease in pain to 1/10 at worst to allow for sitting, driving with rare complaints. [x] Progressing: [] Met: [] Not Met: [] Adjusted  5. Independent in HEP progression per patient tolerance, in order to prevent re-injury. [x] Progressing: [] Met: [] Not Met: [] Adjusted    ASSESSMENT:  Overall less consistent pain since beginning PT, also noted improving flexibility but still with deficits. Improving function per FOTO Lumbar Spine score was higher. Pt will benefit from further therapy to address deficits to return to PLOF. Recommend continue PT 2x per week for 4 weeks. Pain after session 5-6/10.       Treatment/Activity Tolerance:  [x] Patient tolerated treatment well [] Patient limited by jesse  [] Patient limited by pain  [] Patient limited by other medical complications  [] Other:     Overall Progression Towards Functional goals/ Treatment Progress Update:  [] Patient is progressing as expected towards functional goals listed. [x] Progression is slowed due to complexities/Impairments listed, limited HEP  [] Progression has been slowed due to co-morbidities. [] Plan just implemented, too soon to assess goals progression <30days   [] Goals require adjustment due to lack of progress  [] Patient is not progressing as expected and requires additional follow up with physician  [] Other    Prognosis for POC: [x] Good [] Fair  [] Poor    Patient requires continued skilled intervention: [x] Yes  [] No        PLAN:   [x] Continue per plan of care [] Alter current plan (see comments)  [] Plan of care initiated [] Hold pending MD visit [] Discharge    Electronically signed by: Janusz Dubon, PT, DPT [id number]    Note: If patient does not return for scheduled/recommended follow up visits, this note will serve as a discharge from care along with the most recent update on progress.

## 2022-10-13 ENCOUNTER — HOSPITAL ENCOUNTER (OUTPATIENT)
Dept: PHYSICAL THERAPY | Age: 27
Setting detail: THERAPIES SERIES
Discharge: HOME | End: 2022-10-13
Payer: MEDICAID

## 2022-10-13 NOTE — CARE COORDINATION
[facility] Outpatient [facility]  [address], [address]  Phone: [phone]   Fax: [phone]    Physical Therapy Missed Visit Note     Date:  10/13/2022    Patient Name:  Robert Person      :  1995    MRN: [id number]      Cancelled visits to date: 2022  No-shows to date: 1  10/13/2022    For today's appointment patient:  []  Cancelled  []  Rescheduled appointment  [x]  No-show     Reason given by patient:  []  Patient ill  []  Conflicting appointment  []  No transportation    []  Conflict with work  []  No reason given  []  Other:     Comments:      Electronically signed by:   Luz Maria Kumari, DPT [id number]

## 2023-03-01 ENCOUNTER — OFFICE VISIT (OUTPATIENT)
Dept: PRIMARY CARE CLINIC | Age: 28
End: 2023-03-01
Payer: MEDICAID

## 2023-03-01 ENCOUNTER — INITIAL CONSULT (OUTPATIENT)
Dept: PSYCHOLOGY | Age: 28
End: 2023-03-01

## 2023-03-01 VITALS
HEIGHT: 72 IN | WEIGHT: 163.6 LBS | HEART RATE: 83 BPM | OXYGEN SATURATION: 98 % | DIASTOLIC BLOOD PRESSURE: 74 MMHG | RESPIRATION RATE: 16 BRPM | TEMPERATURE: 97.7 F | BODY MASS INDEX: 22.16 KG/M2 | SYSTOLIC BLOOD PRESSURE: 116 MMHG

## 2023-03-01 DIAGNOSIS — G89.29 CHRONIC MIDLINE LOW BACK PAIN WITHOUT SCIATICA: Primary | ICD-10-CM

## 2023-03-01 DIAGNOSIS — F90.2 ADHD (ATTENTION DEFICIT HYPERACTIVITY DISORDER), COMBINED TYPE: Primary | ICD-10-CM

## 2023-03-01 DIAGNOSIS — M54.50 CHRONIC MIDLINE LOW BACK PAIN WITHOUT SCIATICA: Primary | ICD-10-CM

## 2023-03-01 PROCEDURE — G8420 CALC BMI NORM PARAMETERS: HCPCS | Performed by: NURSE PRACTITIONER

## 2023-03-01 PROCEDURE — 99213 OFFICE O/P EST LOW 20 MIN: CPT | Performed by: NURSE PRACTITIONER

## 2023-03-01 PROCEDURE — G8484 FLU IMMUNIZE NO ADMIN: HCPCS | Performed by: NURSE PRACTITIONER

## 2023-03-01 PROCEDURE — 1036F TOBACCO NON-USER: CPT | Performed by: NURSE PRACTITIONER

## 2023-03-01 PROCEDURE — G8427 DOCREV CUR MEDS BY ELIG CLIN: HCPCS | Performed by: NURSE PRACTITIONER

## 2023-03-01 RX ORDER — ACETAMINOPHEN 500 MG
1000 TABLET ORAL 3 TIMES DAILY
Qty: 540 TABLET | Refills: 1 | Status: SHIPPED | OUTPATIENT
Start: 2023-03-01

## 2023-03-01 RX ORDER — CEPHALEXIN 500 MG/1
500 CAPSULE ORAL 2 TIMES DAILY
Qty: 14 CAPSULE | Refills: 0 | Status: SHIPPED
Start: 2023-03-01 | End: 2023-03-01 | Stop reason: CLARIF

## 2023-03-01 SDOH — ECONOMIC STABILITY: INCOME INSECURITY: HOW HARD IS IT FOR YOU TO PAY FOR THE VERY BASICS LIKE FOOD, HOUSING, MEDICAL CARE, AND HEATING?: HARD

## 2023-03-01 SDOH — ECONOMIC STABILITY: FOOD INSECURITY: WITHIN THE PAST 12 MONTHS, THE FOOD YOU BOUGHT JUST DIDN'T LAST AND YOU DIDN'T HAVE MONEY TO GET MORE.: OFTEN TRUE

## 2023-03-01 SDOH — ECONOMIC STABILITY: FOOD INSECURITY: WITHIN THE PAST 12 MONTHS, YOU WORRIED THAT YOUR FOOD WOULD RUN OUT BEFORE YOU GOT MONEY TO BUY MORE.: OFTEN TRUE

## 2023-03-01 SDOH — ECONOMIC STABILITY: HOUSING INSECURITY
IN THE LAST 12 MONTHS, WAS THERE A TIME WHEN YOU DID NOT HAVE A STEADY PLACE TO SLEEP OR SLEPT IN A SHELTER (INCLUDING NOW)?: NO

## 2023-03-01 ASSESSMENT — PATIENT HEALTH QUESTIONNAIRE - PHQ9
SUM OF ALL RESPONSES TO PHQ QUESTIONS 1-9: 0
1. LITTLE INTEREST OR PLEASURE IN DOING THINGS: 0
SUM OF ALL RESPONSES TO PHQ QUESTIONS 1-9: 0
SUM OF ALL RESPONSES TO PHQ9 QUESTIONS 1 & 2: 0
2. FEELING DOWN, DEPRESSED OR HOPELESS: 0
SUM OF ALL RESPONSES TO PHQ QUESTIONS 1-9: 0
SUM OF ALL RESPONSES TO PHQ QUESTIONS 1-9: 0

## 2023-03-01 ASSESSMENT — ENCOUNTER SYMPTOMS
BACK PAIN: 1
GASTROINTESTINAL NEGATIVE: 1
EYES NEGATIVE: 1
RESPIRATORY NEGATIVE: 1

## 2023-03-01 NOTE — PROGRESS NOTES
Behavioral Health Consultation  Mary Rodriguez Psy.D. Psychologist  3/1/2023        Time spent with client or family: 50 minutes  This is patient's first [facility] appointment. Reason for Consult:    Chief Complaint   Patient presents with    ADHD     Referring Provider: [address]  [facility]    Client and/or guardian provided informed consent for the behavioral health program. Discussed with client and/or guadian model of service to include the limits of confidentiality (i.e. abuse reporting, suicide intervention, etc.) and short-term intervention focused approach. Family indicated understanding and agreement to limits of confidentiality. Feedback given to PCP. Subjective:  Family:  Boy Garland resides in his home with his  wife, child, and mother-in-law . His child is almost two years old. He finds the marriage is supportive. Genia had a conflict with his family of origin last year. He feels these boundaries have been helpful for him. Family history is significant for no known psychiatric concerns. Current stressors reported include: last year he bought a car, a house, and a new job. Health/Development:  Genia's educational history includes graduating from high school, six years of college with three years with multiple majors. Currently, Boy Garland is reported to demonstrate self-sufficiency in most areas of self-care. 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 He has bounced from one job to another quite frequently. 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 Genia does have difficulties sleeping. He has difficulty falling asleep between 11-1. He occasionally takes naps. He consumes caffeine never. Genia used to get sick frequently, which he associates with times that he was seeing his family (nauesea, vomiting).      Emotional/Social:  Boy Garland reports that he does not have a significant legal history. Driving history is reportedly unproblematic. In regards to attention, hyperactivity, and impulsivity, Genia reports careless mistakes, inattention, difficulty completing tasks, disorganization, avoids tasks that require sustained mental effort, losing items, distraction, forgetful, fidgeting, restlessness, talks excessively, difficulty waiting turns, interrupts or intrudes on others. He was homeschooled; he participated in [employer]. As a kid, he had difficulty completing chores. He played sports and had some difficulty concentrating unless he loved it (ultimate frisbee). He reports that he was distractible. He has difficulty with emotional regulation, feels he can fall asleep easier. He feels that he has a poor memory. In high school, he feels that he had difficulty with anger management. He feels that this may be due to some trauma exposure as a child. This can cause problems with attendance at work in the past. In estefani high he participated in [employer]. Genia has not been previously diagnosed with ADHD. In terms of substance use, Genia endorses the following: no alcohol use. Genia does not report feeling that he experiences more sadness than others. Genia does have a history of depressive episodes. This occurred several times during college, with the last one being about a year ago. Genia  does not report a history of manic symptoms. Genia does not report feeling that he experiences more anxiety than others. Specifically, he reports some anxiety regarding his family of origin  They indicated that he does experience OCD-like symptoms. Finally, Genia reports that he has rare anger outbursts. Genia reports no history of physical or sexual abuse and indicated no current or prior suicidal or homicidal ideation, intent, or plan. He has a history of suicidal thoughts, with the last time being December of 2021. This has been a problem in the past at times. He denies any intent or action. He has not participated in counseling or therapy before. He experienced some trauma during childhood, including his mother repeatedly struggling with breast cancer, she lived in the hospital reportedly for two years as a child. Objective:  MSE:  Appearance    alert, cooperative  Appetite normal  Sleep disturbance Yes  Fatigue No  Loss of pleasure No  Impulsive behavior Yes  Speech    tangential and hyperverbal  Mood    euthymic   Affect    normal affect  Thought Content    intact  Thought Process    linear  Associations    logical connections  Insight    Fair  Judgment    Intact  Orientation    oriented to person, place, time, and general circumstances  Memory    recent and remote memory intact  Attention/Concentration    intact  Morbid ideation No  Suicide Assessment    no suicidal ideation    Assessment:  eMlecio Hudson presents for an initial [facility] appointment regarding concerns related to attention. Current safety concerns reported include: none. Diagnosis:  1. ADHD (attention deficit hyperactivity disorder), combined type      No past medical history on file. Plan:  Pt interventions:  Gathered relevant background information and discussed [facility] role. Discussed ADHD diagnostic process; he will have wife and himself complete ADHD questionnaire and return; we will then meet to discussed feedback and recommendations.     Pt Behavioral Change Plan:   See above

## 2023-03-01 NOTE — PROGRESS NOTES
Olivia Matute (:  1995) is a 32 y.o. male,Established patient, here for evaluation of the following chief complaint(s):  Back Pain (Follow up)         ASSESSMENT/PLAN:  1. Chronic midline low back pain without sciatica  -     acetaminophen (TYLENOL) 500 MG tablet; Take 2 tablets by mouth 3 times daily, Disp-540 tablet, R-1Normal    No follow-ups on file. Subjective   SUBJECTIVE/OBJECTIVE:  HPI  Patient presents with back pain; he is following up after previous visit which showed some arthritic changes and recommendation was physical therapy which he stated he was not consistent with physical therapy exercises and has some electrotherapy but  was not taking approppriate dosage of tylenol  Review of Systems   Constitutional: Negative. HENT: Negative. Eyes: Negative. Respiratory: Negative. Cardiovascular: Negative. Gastrointestinal: Negative. Endocrine: Negative. Genitourinary: Negative. Musculoskeletal:  Positive for back pain. Skin: Negative. Neurological: Negative. Hematological: Negative. Psychiatric/Behavioral: Negative. Objective   Physical Exam  HENT:      Right Ear: Tympanic membrane and ear canal normal.      Left Ear: Tympanic membrane and ear canal normal.      Nose: Nose normal.      Mouth/Throat:      Mouth: Mucous membranes are moist.   Eyes:      Extraocular Movements: Extraocular movements intact. Cardiovascular:      Rate and Rhythm: Normal rate. Pulses: Normal pulses. Heart sounds: Normal heart sounds. Pulmonary:      Effort: Pulmonary effort is normal.      Breath sounds: Normal breath sounds. Abdominal:      General: Bowel sounds are normal.      Palpations: Abdomen is soft. Musculoskeletal:         General: Normal range of motion. Cervical back: Normal range of motion. Lumbar back: Tenderness present. Skin:     General: Skin is warm. Neurological:      General: No focal deficit present.       Mental Status: He is alert and oriented to person, place, and time. Psychiatric:         Mood and Affect: Mood normal.         Behavior: Behavior normal.         Thought Content: Thought content normal.         Judgment: Judgment normal.          On this date 3/1/2023 I have spent 20 minutes reviewing previous notes, test results and face to face with the patient discussing the diagnosis and importance of compliance with the treatment plan as well as documenting on the day of the visit. An electronic signature was used to authenticate this note.     --NITIN Vaughan - CNP

## 2023-03-14 NOTE — PROGRESS NOTES
Behavioral Health Consultation  Vivien Lancaster Psy.D. Psychologist  3/14/2023        Time spent with client or family: 25 minutes  This is patient's second  [facility] appointment. Reason for Consult:    Chief Complaint   Patient presents with    ADHD     Referring Provider: MD Daphney Gore 71,  [address]    Feedback given to PCP. S:  Juan Gore reports that he is doing much the same. He recognizes many of the aspects of ADHD in his life and is not surprised by the diagnosis. He asks relevant questions and provides examples from his own life during discussion. Genia does not recognize as many hyperactive/impulsive symptoms in himself, but understands this when provided information about this symptom cluster in adults. O:  MSE:  Appearance    alert, cooperative  Appetite abnormal: dysregulated  Sleep disturbance Yes  Fatigue Yes  Loss of pleasure No  Impulsive behavior Yes  Speech    hyperverbal, tangential  Mood    euthymic   Affect    normal affect  Thought Content    intact  Thought Process    linear  Associations    logical connections  Insight    Fair  Judgment    Intact  Orientation    oriented to person, place, time, and general circumstances  Memory    recent and remote memory intact  Attention/Concentration    intact  Morbid ideation No  Suicide Assessment    no suicidal ideation    A:  Genia presents for a follow up [facility] appointment regarding concerns related to attention. These symptoms are show no change. Safety concerns reported include: none. Diagnosis:  1. ADHD (attention deficit hyperactivity disorder), combined type        Plan:  Pt interventions:  Reviewed progress and provided praise for effective coping. Provided psychoeducation regarding what went into diagnostic process for ADHD-C and the diagnosis itself. Discussed empirically supported treatment for ADHD and answered questions.  Genia will meet with PCP to follow up, will follow up with this [facility] as needed.     Pt Behavioral Change Plan:   See above

## 2023-03-15 ENCOUNTER — OFFICE VISIT (OUTPATIENT)
Dept: PSYCHOLOGY | Age: 28
End: 2023-03-15
Payer: MEDICAID

## 2023-03-15 DIAGNOSIS — F90.2 ADHD (ATTENTION DEFICIT HYPERACTIVITY DISORDER), COMBINED TYPE: Primary | ICD-10-CM

## 2023-03-15 PROCEDURE — 90832 PSYTX W PT 30 MINUTES: CPT | Performed by: PSYCHOLOGIST

## 2023-03-16 ENCOUNTER — PATIENT MESSAGE (OUTPATIENT)
Dept: PRIMARY CARE CLINIC | Age: 28
End: 2023-03-16

## 2023-03-16 ENCOUNTER — OFFICE VISIT (OUTPATIENT)
Dept: PRIMARY CARE CLINIC | Age: 28
End: 2023-03-16
Payer: MEDICAID

## 2023-03-16 VITALS
BODY MASS INDEX: 21.68 KG/M2 | DIASTOLIC BLOOD PRESSURE: 60 MMHG | TEMPERATURE: 98 F | HEIGHT: 72 IN | HEART RATE: 75 BPM | SYSTOLIC BLOOD PRESSURE: 108 MMHG | WEIGHT: 160.06 LBS | OXYGEN SATURATION: 98 %

## 2023-03-16 DIAGNOSIS — F90.9 ATTENTION DEFICIT HYPERACTIVITY DISORDER (ADHD), UNSPECIFIED ADHD TYPE: ICD-10-CM

## 2023-03-16 DIAGNOSIS — F90.9 ATTENTION DEFICIT HYPERACTIVITY DISORDER (ADHD), UNSPECIFIED ADHD TYPE: Primary | ICD-10-CM

## 2023-03-16 PROCEDURE — 99213 OFFICE O/P EST LOW 20 MIN: CPT | Performed by: NURSE PRACTITIONER

## 2023-03-16 RX ORDER — DEXTROAMPHETAMINE SACCHARATE, AMPHETAMINE ASPARTATE MONOHYDRATE, DEXTROAMPHETAMINE SULFATE AND AMPHETAMINE SULFATE 5; 5; 5; 5 MG/1; MG/1; MG/1; MG/1
20 CAPSULE, EXTENDED RELEASE ORAL EVERY MORNING
Qty: 30 CAPSULE | Refills: 0 | Status: SHIPPED | OUTPATIENT
Start: 2023-03-16 | End: 2023-03-20 | Stop reason: SDUPTHER

## 2023-03-16 ASSESSMENT — ENCOUNTER SYMPTOMS
RESPIRATORY NEGATIVE: 1
EYES NEGATIVE: 1
GASTROINTESTINAL NEGATIVE: 1

## 2023-03-16 NOTE — PROGRESS NOTES
Benitez Kim (:  1995) is a 32 y.o. male,Established patient, here for evaluation of the following chief complaint(s):  Follow-up (Discuss adhd medication options.)         ASSESSMENT/PLAN:  1. Attention deficit hyperactivity disorder (ADHD), unspecified ADHD type  -     amphetamine-dextroamphetamine (ADDERALL XR) 20 MG extended release capsule; Take 1 capsule by mouth every morning for 30 days. Max Daily Amount: 20 mg, Disp-30 capsule, R-0Normal    No follow-ups on file. Subjective   SUBJECTIVE/OBJECTIVE:  HPI  Patient present for ADHD management; he was diagnosed by psychology and was referred for medication management; he was home schooled so was not diagnosed when he was younger. discussed medication options with risks and benefits. PDMP reviewed without any concerns. controlled substance contract reviewed and signed. Patient to call or RTO with any complaint  Review of Systems   Constitutional: Negative. HENT: Negative. Eyes: Negative. Respiratory: Negative. Cardiovascular: Negative. Gastrointestinal: Negative. Endocrine: Negative. Genitourinary: Negative. Musculoskeletal: Negative. Skin: Negative. Neurological: Negative. Hematological: Negative. Psychiatric/Behavioral:  Positive for decreased concentration. Objective   Physical Exam  HENT:      Right Ear: Tympanic membrane and ear canal normal.      Left Ear: Tympanic membrane and ear canal normal.      Nose: Nose normal.      Mouth/Throat:      Mouth: Mucous membranes are moist.   Eyes:      Extraocular Movements: Extraocular movements intact. Cardiovascular:      Rate and Rhythm: Normal rate. Pulses: Normal pulses. Heart sounds: Normal heart sounds. Pulmonary:      Effort: Pulmonary effort is normal.      Breath sounds: Normal breath sounds. Abdominal:      General: Bowel sounds are normal.      Palpations: Abdomen is soft. Musculoskeletal:         General: Normal range of motion.

## 2023-03-17 ENCOUNTER — TELEPHONE (OUTPATIENT)
Dept: PRIMARY CARE CLINIC | Age: 28
End: 2023-03-17

## 2023-03-17 NOTE — TELEPHONE ENCOUNTER
From: Aaron Conway  To: Dr. Heather Mendoza  Sent: 3/16/2023 4:03 PM EDT  Subject: Adderall availability    My [facility] is out until Monday. However they have it at [address] [facility]. Could you send my prescription there? Thank you!

## 2023-03-17 NOTE — TELEPHONE ENCOUNTER
Patient would like Pharmacy changed to [facility] on [address] in [place] as his Pharmacy is currently out but it is in currently in [facility] at [address] in [place] [address]

## 2023-03-17 NOTE — TELEPHONE ENCOUNTER
Medication:   Requested Prescriptions     Pending Prescriptions Disp Refills    amphetamine-dextroamphetamine (ADDERALL XR) 20 MG extended release capsule 30 capsule 0     Sig: Take 1 capsule by mouth every morning for 30 days. Max Daily Amount: 20 mg        Last Filled:      Patient Phone Number: [phone] (home)     Last appt: 3/16/2023   Next appt: 4/17/2023    Last OARRS: No flowsheet data found.

## 2023-03-20 RX ORDER — DEXTROAMPHETAMINE SACCHARATE, AMPHETAMINE ASPARTATE MONOHYDRATE, DEXTROAMPHETAMINE SULFATE AND AMPHETAMINE SULFATE 5; 5; 5; 5 MG/1; MG/1; MG/1; MG/1
20 CAPSULE, EXTENDED RELEASE ORAL EVERY MORNING
Qty: 30 CAPSULE | Refills: 0 | Status: SHIPPED | OUTPATIENT
Start: 2023-03-20 | End: 2023-04-19

## 2023-04-16 DIAGNOSIS — F90.9 ATTENTION DEFICIT HYPERACTIVITY DISORDER (ADHD), UNSPECIFIED ADHD TYPE: ICD-10-CM

## 2023-04-17 RX ORDER — DEXTROAMPHETAMINE SACCHARATE, AMPHETAMINE ASPARTATE MONOHYDRATE, DEXTROAMPHETAMINE SULFATE AND AMPHETAMINE SULFATE 5; 5; 5; 5 MG/1; MG/1; MG/1; MG/1
20 CAPSULE, EXTENDED RELEASE ORAL EVERY MORNING
Qty: 30 CAPSULE | Refills: 0 | Status: SHIPPED | OUTPATIENT
Start: 2023-04-18 | End: 2023-04-19

## 2023-04-19 ENCOUNTER — OFFICE VISIT (OUTPATIENT)
Dept: PRIMARY CARE CLINIC | Age: 28
End: 2023-04-19
Payer: MEDICAID

## 2023-04-19 VITALS
OXYGEN SATURATION: 98 % | HEIGHT: 72 IN | HEART RATE: 70 BPM | BODY MASS INDEX: 21.29 KG/M2 | WEIGHT: 157.2 LBS | RESPIRATION RATE: 16 BRPM | TEMPERATURE: 97.6 F | SYSTOLIC BLOOD PRESSURE: 104 MMHG | DIASTOLIC BLOOD PRESSURE: 68 MMHG

## 2023-04-19 DIAGNOSIS — F90.9 ATTENTION DEFICIT HYPERACTIVITY DISORDER (ADHD), UNSPECIFIED ADHD TYPE: ICD-10-CM

## 2023-04-19 PROCEDURE — 99213 OFFICE O/P EST LOW 20 MIN: CPT | Performed by: NURSE PRACTITIONER

## 2023-04-28 DIAGNOSIS — G47.00 INSOMNIA, UNSPECIFIED TYPE: Primary | ICD-10-CM

## 2023-04-28 RX ORDER — TRAZODONE HYDROCHLORIDE 50 MG/1
50 TABLET ORAL NIGHTLY
Qty: 90 TABLET | Refills: 1 | Status: SHIPPED | OUTPATIENT
Start: 2023-04-28

## 2023-05-18 ENCOUNTER — OFFICE VISIT (OUTPATIENT)
Dept: PRIMARY CARE CLINIC | Age: 28
End: 2023-05-18
Payer: MEDICAID

## 2023-05-18 VITALS
HEART RATE: 78 BPM | BODY MASS INDEX: 21.43 KG/M2 | WEIGHT: 158.2 LBS | HEIGHT: 72 IN | SYSTOLIC BLOOD PRESSURE: 116 MMHG | OXYGEN SATURATION: 97 % | DIASTOLIC BLOOD PRESSURE: 84 MMHG | TEMPERATURE: 97.9 F | RESPIRATION RATE: 16 BRPM

## 2023-05-18 DIAGNOSIS — F90.9 ATTENTION DEFICIT HYPERACTIVITY DISORDER (ADHD), UNSPECIFIED ADHD TYPE: Primary | ICD-10-CM

## 2023-05-18 PROCEDURE — 99214 OFFICE O/P EST MOD 30 MIN: CPT | Performed by: FAMILY MEDICINE

## 2023-05-18 RX ORDER — DEXTROAMPHETAMINE SACCHARATE, AMPHETAMINE ASPARTATE MONOHYDRATE, DEXTROAMPHETAMINE SULFATE AND AMPHETAMINE SULFATE 7.5; 7.5; 7.5; 7.5 MG/1; MG/1; MG/1; MG/1
30 CAPSULE, EXTENDED RELEASE ORAL EVERY MORNING
Qty: 14 CAPSULE | Refills: 0 | Status: SHIPPED | OUTPATIENT
Start: 2023-05-18 | End: 2023-06-01

## 2023-05-18 NOTE — PROGRESS NOTES
Subjective:   Patient ID: Geovanni Hanson is a 29 y.o. male. HPI by clinical support staff:   Chief Complaint   Patient presents with    Medication Check        Preliminary data above this line collected by clinical support staff.    ______________________________________________________________________  HPI by Provider:   HPI   Patient was started on adderall 20mg but states it was not very effective so was switched to Vyvanse but it caused headaches and wasn't effective. Feels he has difficulty focusing on completing tasks. No other concerns. Data above this line collected by Provider. Patient's medications, allergies, past medical, surgical, social and family histories were reviewed and updated as appropriate. Patient Care Team:  Liliana Shepherd MD as PCP - General (Family Medicine)  iLliana Shepherd MD as PCP - [facility] Provider  No current outpatient medications on file prior to visit. No current facility-administered medications on file prior to visit. Review of Systems   Constitutional:  Negative for activity change, appetite change, fatigue and fever. HENT:  Negative for congestion, rhinorrhea and sore throat. Respiratory:  Negative for chest tightness and shortness of breath. Cardiovascular:  Negative for chest pain, palpitations and leg swelling. Gastrointestinal:  Negative for abdominal pain, constipation and diarrhea. Genitourinary:  Negative for dysuria and frequency. Musculoskeletal:  Negative for arthralgias. Neurological:  Negative for dizziness, weakness and headaches. Psychiatric/Behavioral:  Positive for decreased concentration. Negative for hallucinations. All other systems reviewed and are negative. ROS above this line reviewed by Provider. Objective:   /84   Pulse 78   Temp 97.9 °F (36.6 °C) (Oral)   Resp 16   Ht 6' (1.829 m)   Wt 158 lb 3.2 oz (71.8 kg)   SpO2 97%   BMI 21.46 kg/m²   Physical Exam  Vitals and nursing note reviewed.

## 2023-05-20 ASSESSMENT — ENCOUNTER SYMPTOMS
DIARRHEA: 0
SHORTNESS OF BREATH: 0
CHEST TIGHTNESS: 0
RHINORRHEA: 0
CONSTIPATION: 0
SORE THROAT: 0
ABDOMINAL PAIN: 0